# Patient Record
Sex: MALE | Race: BLACK OR AFRICAN AMERICAN | NOT HISPANIC OR LATINO | Employment: FULL TIME | URBAN - METROPOLITAN AREA
[De-identification: names, ages, dates, MRNs, and addresses within clinical notes are randomized per-mention and may not be internally consistent; named-entity substitution may affect disease eponyms.]

---

## 2019-10-08 ENCOUNTER — APPOINTMENT (OUTPATIENT)
Dept: PHYSICAL THERAPY | Facility: CLINIC | Age: 49
End: 2019-10-08
Payer: OTHER MISCELLANEOUS

## 2019-10-08 ENCOUNTER — EVALUATION (OUTPATIENT)
Dept: PHYSICAL THERAPY | Facility: CLINIC | Age: 49
End: 2019-10-08
Payer: OTHER MISCELLANEOUS

## 2019-10-08 DIAGNOSIS — G89.29 CHRONIC RIGHT SHOULDER PAIN: Primary | ICD-10-CM

## 2019-10-08 DIAGNOSIS — M25.511 CHRONIC RIGHT SHOULDER PAIN: Primary | ICD-10-CM

## 2019-10-08 PROCEDURE — 97110 THERAPEUTIC EXERCISES: CPT | Performed by: PHYSICAL THERAPIST

## 2019-10-08 PROCEDURE — 97161 PT EVAL LOW COMPLEX 20 MIN: CPT | Performed by: PHYSICAL THERAPIST

## 2019-10-08 NOTE — LETTER
2019    Mikhail Clark MD   [facility] [id number]    Patient: Dipti Levi   YOB: 1970   Date of Visit: 10/8/2019     Encounter Diagnosis     ICD-10-CM    1  Chronic right shoulder pain M25 511     G89 29        Dear Dr dAdie Jimenez:    Thank you for your recent referral of Dipti Levi  Please review the attached evaluation summary from Khoa's recent visit  Please verify that you agree with the plan of care by signing the attached order  If you have any questions or concerns, please do not hesitate to call  I sincerely appreciate the opportunity to share in the care of one of your patients and hope to have another opportunity to work with you in the near future  Sincerely,    Sonu Suh, PT      Referring Provider:      I certify that I have read the below Plan of Care and certify the need for these services furnished under this plan of treatment while under my care  Mikhail Clark MD  [address] Facsimile: [phone]          PT Evaluation     Today's date: 10/8/2019  Patient name: Dipti Levi  : 1970  MRN: [id number]  Referring provider: Geneva Lama MD  Dx:   Encounter Diagnosis     ICD-10-CM    1  Chronic right shoulder pain M25 511     G89 29        Start Time: 1800  Stop Time: 1845  Total time in clinic (min): 45 minutes    Assessment  Assessment details: Dipti Levi is a 47 y o  male who presents to physical therapy with diagnosis of right shoulder pain  Garrisonvictor m Rodney presents with pain, poor posture, and limitations in range of motion, strength, joint mobility, and functional ability  He tested positive for involvement of RTC, will confirm when patient brings MRI documentation next visit  The current limitations are affecting Khoa's ability to function at prior level   He will benefit from skilled physical therapy to address the current impairments and functional limitations to enable him to return to daily activities as well as his ability to complete work tasks at premorbid status  Thank you for the referral     Patient demonstrates good understanding and tolerance to provided home exercise program    Impairments: abnormal muscle firing, abnormal muscle tone, abnormal or restricted ROM, abnormal movement, activity intolerance, impaired physical strength, lacks appropriate home exercise program, pain with function and poor posture     Symptom irritability: lowUnderstanding of Dx/Px/POC: good   Prognosis: good    Goals  STG  Patient will decrease pain at worst to 4-5/10  Patient will improve right shoulder flexion & abduction AROM 5 degrees  Patient will be independent with home exercise program    LTG  Patient will decrease pain at worst to 1-2/10  Patient will demonstrate full pain free R shoulder AROM  Patient will improve R shoulder strength 1/2 grade in all planes   Patient will report ability to complete ADLs without pain or limitations  Patient will improve FOTO score to 63    Plan  Patient would benefit from: skilled physical therapy  Planned modality interventions: cryotherapy, thermotherapy: hydrocollator packs, high voltage pulsed current: pain management, high voltage pulsed current: spasm management and unattended electrical stimulation  Planned therapy interventions: home exercise program, therapeutic exercise, therapeutic activities, therapeutic training, patient education, manual therapy and neuromuscular re-education  Frequency: 2x week  Duration in visits: 12  Duration in weeks: 6  Treatment plan discussed with: patient        Subjective Evaluation    History of Present Illness  Mechanism of injury: Reagan Castillo is a 47 y o  male presenting to physical therapy on 10/08/19 with primary complaints of right shoulder pain  He mentions the pain started 12/26/2018   He states 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 and he felt the pain in the shoulder and tried to lift them up and needed to get assistance secondary to a sharp pain in the right shoulder  He reports he was getting therapy in [place] for about 6 months and then when he moved here it took about 3 months for him to get an appointment here  He reports he got an injection about 2 weeks ago in the R which has slightly relieved the symptoms, he does have pain on the L now from using it more/compensating  He reports he is able to get ADLs done but it is painful,  He reports overhead, behind back, and across body motions hurt the body  He has been doing tasks at work with left hand to limit use of R  He reports he gets he occasionally gets some numbness in his his upper arm but not very often  He reports getting an MRI which showed a tear, he believes it was a RTC muscle but unsure, he will bring paperwork next visit  Pain  Current pain ratin  At best pain rating: 3  At worst pain ratin (prior to shot)  Location: anterior/lateral shoulder  Quality: sharp and throbbing  Relieving factors: medications  Aggravating factors: overhead activity    Hand dominance: right      Diagnostic Tests  MRI studies: abnormal  Treatments  Previous treatment: physical therapy and injection treatment  Patient Goals  Patient goals for therapy: decreased pain, increased motion and independence with ADLs/IADLs          Objective     Postural Observations  Seated posture: poor  Standing posture: poor        Cervical/Thoracic Screen   Cervical range of motion within normal limits    Active Range of Motion   Left Shoulder   Flexion: 150 degrees   Abduction: 160 degrees   External rotation BTH: T4   Internal rotation BTB: T6     Right Shoulder   Flexion: 132 degrees with pain  Abduction: 145 degrees with pain  External rotation BTH: Active external rotation behind the head: base of occiput   with pain  Internal rotation BTB: T11 with pain    Scapular Mobility     Right Shoulder   Scapular mobility: fair    Strength/Myotome Testing     Left Shoulder     Planes of Motion   Flexion: 4+   Abduction: 4+   External rotation at 0°:  5   Internal rotation at 0°:  5     Right Shoulder     Planes of Motion   Flexion: 4- (P!)   Abduction: 3+ (P!)   External rotation at 90°:  3+   Internal rotation at 0°:  4     Tests     Right Shoulder   Positive belly press, empty can, Hawkin's, horn blower and lift-off  Flowsheet Rows      Most Recent Value   PT/OT G-Codes   Current Score  47   Projected Score  63             Precautions: HTN      Manual  10/8            STJ lateral glide             R shoulder PROM             STM R UT                                           Exercise Diary  10/8            Pulleys Flex&abd nv            UBE fwd/bkwrd nv            scap retractions 5"x10            UT stretch 30"            IR strap Stretch 10"x10            Table slides scaption 10"x10            Table slides ER nv            Shoulder Isometrics nv            TB rows nv            TB extension nv            TB ER  Start with walk out              TB IR Start with walk out            Ball on wall cw/ccw                                                                                                            Modalities  10/8            CP PRN

## 2019-10-08 NOTE — PROGRESS NOTES
PT Evaluation     Today's date: 10/8/2019  Patient name: Jeannette Guo  : 1970  MRN: [id number]  Referring provider: Lyric Yi MD  Dx:   Encounter Diagnosis     ICD-10-CM    1  Chronic right shoulder pain M25 511     G89 29        Start Time: 1800  Stop Time: 1845  Total time in clinic (min): 45 minutes    Assessment  Assessment details: Jeannette Guo is a 47 y o  male who presents to physical therapy with diagnosis of right shoulder pain  Nicholas Turcios presents with pain, poor posture, and limitations in range of motion, strength, joint mobility, and functional ability  He tested positive for involvement of RTC, will confirm when patient brings MRI documentation next visit  The current limitations are affecting Khoa's ability to function at prior level  He will benefit from skilled physical therapy to address the current impairments and functional limitations to enable him to return to daily activities as well as his ability to complete work tasks at premorbid status   Thank you for the referral     Patient demonstrates good understanding and tolerance to provided home exercise program    Impairments: abnormal muscle firing, abnormal muscle tone, abnormal or restricted ROM, abnormal movement, activity intolerance, impaired physical strength, lacks appropriate home exercise program, pain with function and poor posture     Symptom irritability: lowUnderstanding of Dx/Px/POC: good   Prognosis: good    Goals  STG  Patient will decrease pain at worst to 4-5/10  Patient will improve right shoulder flexion & abduction AROM 5 degrees  Patient will be independent with home exercise program    LTG  Patient will decrease pain at worst to 1-2/10  Patient will demonstrate full pain free R shoulder AROM  Patient will improve R shoulder strength 1/2 grade in all planes   Patient will report ability to complete ADLs without pain or limitations  Patient will improve FOTO score to 63    Plan  Patient would benefit from: skilled physical therapy  Planned modality interventions: cryotherapy, thermotherapy: hydrocollator packs, high voltage pulsed current: pain management, high voltage pulsed current: spasm management and unattended electrical stimulation  Planned therapy interventions: home exercise program, therapeutic exercise, therapeutic activities, therapeutic training, patient education, manual therapy and neuromuscular re-education  Frequency: 2x week  Duration in visits: 12  Duration in weeks: 6  Treatment plan discussed with: patient        Subjective Evaluation    History of Present Illness  Mechanism of injury: Nancy Parr is a 47 y o  male presenting to physical therapy on 10/08/19 with primary complaints of right shoulder pain  He mentions the pain started 2018  He states 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 and he felt the pain in the shoulder and tried to lift them up and needed to get assistance secondary to a sharp pain in the right shoulder  He reports he was getting therapy in [place] for about 6 months and then when he moved here it took about 3 months for him to get an appointment here  He reports he got an injection about 2 weeks ago in the R which has slightly relieved the symptoms, he does have pain on the L now from using it more/compensating  He reports he is able to get ADLs done but it is painful,  He reports overhead, behind back, and across body motions hurt the body  He has been doing tasks at work with left hand to limit use of R  He reports he gets he occasionally gets some numbness in his his upper arm but not very often  He reports getting an MRI which showed a tear, he believes it was a RTC muscle but unsure, he will bring paperwork next visit    Pain  Current pain ratin  At best pain rating: 3  At worst pain ratin (prior to shot)  Location: anterior/lateral shoulder  Quality: sharp and throbbing  Relieving factors: medications  Aggravating factors: overhead activity    Hand dominance: right      Diagnostic Tests  MRI studies: abnormal  Treatments  Previous treatment: physical therapy and injection treatment  Patient Goals  Patient goals for therapy: decreased pain, increased motion and independence with ADLs/IADLs          Objective     Postural Observations  Seated posture: poor  Standing posture: poor        Cervical/Thoracic Screen   Cervical range of motion within normal limits    Active Range of Motion   Left Shoulder   Flexion: 150 degrees   Abduction: 160 degrees   External rotation BTH: T4   Internal rotation BTB: T6     Right Shoulder   Flexion: 132 degrees with pain  Abduction: 145 degrees with pain  External rotation BTH: Active external rotation behind the head: base of occiput  with pain  Internal rotation BTB: T11 with pain    Scapular Mobility     Right Shoulder   Scapular mobility: fair    Strength/Myotome Testing     Left Shoulder     Planes of Motion   Flexion: 4+   Abduction: 4+   External rotation at 0°: 5   Internal rotation at 0°: 5     Right Shoulder     Planes of Motion   Flexion: 4- (P!)   Abduction: 3+ (P!)   External rotation at 90°: 3+   Internal rotation at 0°: 4     Tests     Right Shoulder   Positive belly press, empty can, Hawkin's, horn blower and lift-off  Flowsheet Rows      Most Recent Value   PT/OT G-Codes   Current Score  47   Projected Score  63             Precautions: HTN      Manual  10/8            STJ lateral glide             R shoulder PROM             STM R UT                                           Exercise Diary  10/8            Pulleys Flex&abd nv            UBE fwd/bkwrd nv            scap retractions 5"x10            UT stretch 30"            IR strap Stretch 10"x10            Table slides scaption 10"x10            Table slides ER nv            Shoulder Isometrics nv            TB rows nv            TB extension nv            TB ER  Start with walk out              TB IR Start with walk out Ball on wall cw/ccw                                                                                                            Modalities  10/8            CP PRN

## 2019-10-09 ENCOUNTER — TRANSCRIBE ORDERS (OUTPATIENT)
Dept: PHYSICAL THERAPY | Facility: CLINIC | Age: 49
End: 2019-10-09

## 2019-10-09 DIAGNOSIS — G89.29 CHRONIC RIGHT SHOULDER PAIN: Primary | ICD-10-CM

## 2019-10-09 DIAGNOSIS — M25.511 CHRONIC RIGHT SHOULDER PAIN: Primary | ICD-10-CM

## 2019-10-10 ENCOUNTER — OFFICE VISIT (OUTPATIENT)
Dept: PHYSICAL THERAPY | Facility: CLINIC | Age: 49
End: 2019-10-10
Payer: OTHER MISCELLANEOUS

## 2019-10-10 DIAGNOSIS — G89.29 CHRONIC RIGHT SHOULDER PAIN: Primary | ICD-10-CM

## 2019-10-10 DIAGNOSIS — M25.511 CHRONIC RIGHT SHOULDER PAIN: Primary | ICD-10-CM

## 2019-10-10 PROCEDURE — 97010 HOT OR COLD PACKS THERAPY: CPT

## 2019-10-10 PROCEDURE — 97110 THERAPEUTIC EXERCISES: CPT

## 2019-10-10 PROCEDURE — 97140 MANUAL THERAPY 1/> REGIONS: CPT

## 2019-10-10 NOTE — PROGRESS NOTES
Daily Note     Today's date: 10/10/2019  Patient name: Colonel Gutierrez  : 1970  MRN: [id number]  Referring provider: Nathanael Rojas MD  Dx:   Encounter Diagnosis     ICD-10-CM    1  Chronic right shoulder pain M25 511     G89 29                   Subjective: Pt reports 4/10 pain in R shoulder  He says he felt good after therapy session  Objective: See treatment diary below      Assessment: Tolerated treatment session well today with no increases in pain or discomfort  Pt had most difficulty with ball on wall exercise as he has decreased shoulder endurance  Plan: Continue with current POC to address pt deficits  Precautions: HTN      Manual  10/8 10/10           STJ lateral glide             R shoulder PROM             STM R UT                                           Exercise Diary  10/8 10/10           Pulleys Flex&abd nv 20/20           UBE fwd/bkwrd nv 2'/2'           scap retractions 5"x10 5"x10            UT stretch 30" 30"x3           IR strap Stretch 10"x10            Table slides scaption 10"x10            Table slides ER nv            Shoulder Isometrics nv 10" 5x ea           TB rows nv            TB extension nv            TB ER  Start with walk out              TB IR Start with walk out            Ball on wall cw/ccw  20 ea                                                                                                          Modalities  10/8 10/10           CP PRN

## 2019-10-10 NOTE — PROGRESS NOTES
Daily Note     Today's date: 10/10/2019  Patient name: Leah Chaidez  : 1970  MRN: [id number]  Referring provider: Marixa Marquis MD  Dx:   Encounter Diagnosis     ICD-10-CM    1  Chronic right shoulder pain M25 511     G89 29                   Subjective: Pt reports 4/10 pain in R shoulder  He states he felt good after last therapy session and has been compliant with HEP  Objective: See treatment diary below      Assessment: Tolerated additional exercises well today having most difficulty with ball on wall exercise as pt has decreased endurance  Right upper trapezius tightness presence during STM  Pt is limited in both flexion and abduction  Verbal cueing for proper posture during exercises  Ended therapy with CPx10 minutes to decrease pain  Plan: Continue with current POC to address pt deficits  Precautions: HTN      Manual  10/8 10/10           STJ lateral glide             R shoulder PROM  10'           STM R UT  10'                                         Exercise Diary  10/8 10/10           Pulleys Flex&abd nv 20/20           UBE fwd/bkwrd nv            scap retractions 5"x10 5"x10           UT stretch 30" 30"x3           IR strap Stretch 10"x10            Table slides scaption 10"x10            Table slides ER nv            Shoulder Isometrics nv 10"x5 ea           TB rows nv            TB extension nv            TB ER  Start with walk out              TB IR Start with walk out            Ball on wall cw/ccw  20/20                                                                                                          Modalities  10/8 10/10           CP PRN  10'

## 2019-10-15 ENCOUNTER — OFFICE VISIT (OUTPATIENT)
Dept: PHYSICAL THERAPY | Facility: CLINIC | Age: 49
End: 2019-10-15
Payer: OTHER MISCELLANEOUS

## 2019-10-15 DIAGNOSIS — M25.511 CHRONIC RIGHT SHOULDER PAIN: Primary | ICD-10-CM

## 2019-10-15 DIAGNOSIS — G89.29 CHRONIC RIGHT SHOULDER PAIN: Primary | ICD-10-CM

## 2019-10-15 PROCEDURE — 97110 THERAPEUTIC EXERCISES: CPT

## 2019-10-15 PROCEDURE — 97140 MANUAL THERAPY 1/> REGIONS: CPT

## 2019-10-15 NOTE — PROGRESS NOTES
Daily Note     Today's date: 10/15/2019  Patient name: Tera Cyr  : 1970  MRN: [id number]  Referring provider: Jennifer Greene MD  Dx:   Encounter Diagnosis     ICD-10-CM    1  Chronic right shoulder pain M25 511     G89 29                   Subjective: Pt reports 3/10 pain in R shoulder and states that he felt good after last therapy session  Objective: See treatment diary below      Assessment: Tolerated treatment well today tolerating new exericses without an increase in pain or discomfort  Pt has poor endurance in R shoulder at this time  Relief is felt from STM to R UT  Decreased ROM in flexion and internal rotation noted during PROM  Denied CP post therapy  Plan: Continue with current POC to address pt deficits  Precautions: HTN      Manual  10/8 10/10 10/15          STJ lateral glide             R shoulder PROM  10' 10'          STM R UT  10' 10'                                        Exercise Diary  10/8 10/10 10/15          Pulleys Flex&abd nv  20/20          UBE fwd/bkwrd nv  2'/2'          scap retractions 5"x10 5"x10 5"x15          UT stretch 30" 30"x3 30"x3          IR strap Stretch 10"x10            Table slides scaption 10"x10            Table slides ER nv            Shoulder Isometrics nv 10"x5 ea 10"x5          TB rows nv            TB extension nv            TB ER  Start with walk out    YTB Walk out ISO 10x          TB IR Start with walk out            Ball on wall cw/ccw  20/20 20/20                                                                                                         Modalities  10/8 10/10           CP PRN  10'

## 2019-10-17 ENCOUNTER — OFFICE VISIT (OUTPATIENT)
Dept: PHYSICAL THERAPY | Facility: CLINIC | Age: 49
End: 2019-10-17
Payer: OTHER MISCELLANEOUS

## 2019-10-17 DIAGNOSIS — M25.511 CHRONIC RIGHT SHOULDER PAIN: Primary | ICD-10-CM

## 2019-10-17 DIAGNOSIS — G89.29 CHRONIC RIGHT SHOULDER PAIN: Primary | ICD-10-CM

## 2019-10-17 PROCEDURE — 97110 THERAPEUTIC EXERCISES: CPT

## 2019-10-17 PROCEDURE — 97140 MANUAL THERAPY 1/> REGIONS: CPT

## 2019-10-17 PROCEDURE — 97010 HOT OR COLD PACKS THERAPY: CPT

## 2019-10-17 NOTE — PROGRESS NOTES
Daily Note     Today's date: 10/17/2019  Patient name: Lindsay Snyder  : 1970  MRN: [id number]  Referring provider: Jose Harrington MD  Dx:   Encounter Diagnosis     ICD-10-CM    1  Chronic right shoulder pain M25 511     G89 29                   Subjective: Pt reports 4/10 pain in R shoulder today  Objective: See treatment diary below      Assessment: Due to increase in pain pt was not able to tolerate new exercises  May be able to try circuit UE next session  Pt has most difficulty reaching OH and with horizontal adduction and has poor endurance  Trigger points present in cervical paraspinals as well as R upper trap  Ended therapy with CPx10 min to decrease pain  Plan: Continue with current POC to address pt deficits  Precautions: HTN      Manual  10/8 10/10 10/15 10/17         STJ lateral glide             R shoulder PROM  10' 10' 10'         STM R UT  10' 10' 10'                                       Exercise Diary  10/8 10/10 10/15 10/17         Pulleys Flex&abd nv 20/20 20/20 30/30         UBE fwd/bkwrd nv  2'/2' 2'/2'         scap retractions 5"x10 5"x10 5"x15          UT stretch 30" 30"x3 30"x3          IR strap Stretch 10"x10            Table slides scaption 10"x10            Table slides ER nv            Shoulder Isometrics nv 10"x5 ea 10"x5          TB rows nv            TB extension nv            TB ER  Start with walk out    YTB Walk out ISO 10x YTB walk out ISO 5x         TB IR Start with walk out   YTB walk out ISO 5x         Ball on wall cw/ccw  20/20 20/20 20/20           Grippers    20x bl                                                                                           Modalities  10/8 10/10 10/17          CP PRN  10' 10'

## 2019-10-22 ENCOUNTER — OFFICE VISIT (OUTPATIENT)
Dept: PHYSICAL THERAPY | Facility: CLINIC | Age: 49
End: 2019-10-22
Payer: OTHER MISCELLANEOUS

## 2019-10-22 DIAGNOSIS — M25.511 CHRONIC RIGHT SHOULDER PAIN: Primary | ICD-10-CM

## 2019-10-22 DIAGNOSIS — G89.29 CHRONIC RIGHT SHOULDER PAIN: Primary | ICD-10-CM

## 2019-10-22 PROCEDURE — 97140 MANUAL THERAPY 1/> REGIONS: CPT

## 2019-10-22 PROCEDURE — 97110 THERAPEUTIC EXERCISES: CPT

## 2019-10-22 PROCEDURE — 97035 APP MDLTY 1+ULTRASOUND EA 15: CPT

## 2019-10-22 NOTE — PROGRESS NOTES
Daily Note     Today's date: 10/22/2019  Patient name: Jill Araya  : 1970  MRN: [id number]  Referring provider: Geeta Celestin MD  Dx:   Encounter Diagnosis     ICD-10-CM    1  Chronic right shoulder pain M25 511     G89 29        Start Time: 1750          Subjective: Patient stated pain in shoulder is 3-4/10 today  Objective: See treatment diary below      Assessment: Patient brought in MRI results which says he has a glenoid labral tear  Did not perform TYB ext/int iso walkouts today due to increased irritation  Conferred with primary PT to add pulsed US at 3 3 Mhz and 50% to anterior aspect of shoulder to help increase healing with no contraindications  Pt has most difficulty with external rotation as it irritates the anterior shoulder and is most limited in external rotation during PROM  Ended with CPx10 min to decrease pain  Plan: Continue per plan of care  Precautions: HTN      Manual  10/8 10/10 10/15 10/17 10/22        STJ lateral glide             R shoulder PROM  10' 10' 10' 10'        STM R UT  10' 10 10'                                       Exercise Diary  10/8 10/10 10/15 10/17 10/22        Pulleys Flex&abd nv 20/20 20/20 30/30 30/30        UBE fwd/bkwrd nv  2'/2' 2'/2' 3'/2        scap retractions 5"x10 5"x10 5"x15          UT stretch 30" 30"x3 30"x3          IR strap Stretch 10"x10            Table slides scaption 10"x10            Table slides ER nv            Shoulder Isometrics nv 10"x5 ea 10"x5  10"x5        TB rows nv            TB extension nv            TB ER  Start with walk out    YTB Walk out ISO 10x YTB walk out ISO 5x         TB IR Start with walk out   YTB walk out ISO 5x         Ball on wall cw/ccw  20/20 20/20 20/20   20/20        Grippers    20x bl                                                                                           Modalities  10/8 10/10 10/17 10/22         CP PRN  10' 10'          US 3 3 mhz 50% ant shoulder    8'

## 2019-10-24 ENCOUNTER — APPOINTMENT (OUTPATIENT)
Dept: PHYSICAL THERAPY | Facility: CLINIC | Age: 49
End: 2019-10-24
Payer: OTHER MISCELLANEOUS

## 2019-10-29 ENCOUNTER — OFFICE VISIT (OUTPATIENT)
Dept: PHYSICAL THERAPY | Facility: CLINIC | Age: 49
End: 2019-10-29
Payer: OTHER MISCELLANEOUS

## 2019-10-29 DIAGNOSIS — G89.29 CHRONIC RIGHT SHOULDER PAIN: Primary | ICD-10-CM

## 2019-10-29 DIAGNOSIS — M25.511 CHRONIC RIGHT SHOULDER PAIN: Primary | ICD-10-CM

## 2019-10-29 PROCEDURE — 97112 NEUROMUSCULAR REEDUCATION: CPT

## 2019-10-29 PROCEDURE — 97035 APP MDLTY 1+ULTRASOUND EA 15: CPT

## 2019-10-29 PROCEDURE — 97110 THERAPEUTIC EXERCISES: CPT

## 2019-10-29 NOTE — PROGRESS NOTES
Daily Note     Today's date: 10/29/2019  Patient name: Ray Warner  : 1970  MRN: [id number]  Referring provider: Octaviano Fox MD  Dx:   Encounter Diagnosis     ICD-10-CM    1  Chronic right shoulder pain M25 511     G89 29                   Subjective: Pt reports 5/10 pain in R ant  shoulder  He stated it was worse at work today but as since subsided most likely due to rest        Objective: See treatment diary below      Assessment: Pt tolerated additional exercise well today at a light resistance with no increase in pain  Visual and tactile cueing to ensure correct exercise technique and correct scapular position  Ended with CPx10 min to decrease pain  Plan: Continue with current POC to address pt deficits  Precautions: HTN      Manual  10/8 10/10 10/15 10/17 10/22        STJ lateral glide             R shoulder PROM  10' 10' 10' 10'        STM R UT  10' 10 10'                                       Exercise Diary  10/8 10/10 10/15 10/17 10/22 10/29       Pulleys Flex&abd nv 20/20 20/20 30/30 30/30 30/30       UBE fwd/bkwrd nv  2'/2' 2'/2' 3'/2 3/3       scap retractions 5"x10 5"x10 5"x15          UT stretch 30" 30"x3 30"x3          IR strap Stretch 10"x10            Table slides scaption 10"x10            Table slides ER nv            Shoulder Isometrics nv 10"x5 ea 10"x5  10"x5 10"x5       TB rows nv     YTB 15x       TB extension nv     YTB 15x       TB ER  Start with walk out    YTB Walk out ISO 10x YTB walk out ISO 5x         TB IR Start with walk out   YTB walk out ISO 5x         Ball on wall cw/ccw  20/20 20/20 20/20   20/20        Grippers    20x bl  20x bl       Circuit UE bicep/tricep&chest press      15x ea lvl1                                                                            Modalities  10/8 10/10 10/17 10/22 10/29        CP PRN  10' 10'          US 3 3 mhz 50% ant shoulder    '

## 2019-10-31 ENCOUNTER — OFFICE VISIT (OUTPATIENT)
Dept: PHYSICAL THERAPY | Facility: CLINIC | Age: 49
End: 2019-10-31
Payer: OTHER MISCELLANEOUS

## 2019-10-31 DIAGNOSIS — M25.511 CHRONIC RIGHT SHOULDER PAIN: Primary | ICD-10-CM

## 2019-10-31 DIAGNOSIS — G89.29 CHRONIC RIGHT SHOULDER PAIN: Primary | ICD-10-CM

## 2019-10-31 PROCEDURE — 97110 THERAPEUTIC EXERCISES: CPT

## 2019-10-31 PROCEDURE — 97010 HOT OR COLD PACKS THERAPY: CPT

## 2019-10-31 PROCEDURE — 97112 NEUROMUSCULAR REEDUCATION: CPT

## 2019-10-31 PROCEDURE — 97035 APP MDLTY 1+ULTRASOUND EA 15: CPT

## 2019-10-31 NOTE — PROGRESS NOTES
Daily Note     Today's date: 10/31/2019  Patient name: Jonathan Mendoza  : 1970  MRN: [id number]  Referring provider: Tin Alexis MD  Dx:   Encounter Diagnosis     ICD-10-CM    1  Chronic right shoulder pain M25 511     G89 29                   Subjective: Pt reports that his shoulder was pretty sore yesterday as he was also performing a lot of lifting at work; he is feeling a lot better today  Objective: See treatment diary below      Assessment: Added ball posture to work on correct scapular positioning and posture  Pt needs visual and tactile cueing for correct exercise technique and scapular positioning throughout therapy session  Emphasis on posture during therapy today  Progressed to red thera band without any increases in pain  Ended with MH to increase circulation to R shoulder and relax musculature  Pt was educated to continue being aware of posture throughout the weekend  Plan: Continue with current POC to address pt deficits  Precautions: HTN      Manual  10/8 10/10 10/15 10/17 10/22        STJ lateral glide             R shoulder PROM  10' 10' 10' 10'        STM R UT  10' 10' 10'                                       Exercise Diary  10/8 10/10 10/15 10/17 10/22 10/29 10/31      Pulleys Flex&abd nv 20/20 20/20 30/30 30/30 30/30 30/30      UBE fwd/bkwrd nv  2'/2' 2'/2' 3'/2 3/3 3'/'3'      scap retractions 5"x10 5"x10 5"x15          UT stretch 30" 30"x3 30"x3          IR strap Stretch 10"x10            Table slides scaption 10"x10            Table slides ER nv            Shoulder Isometrics nv 10"x5 ea 10"x5  10"x5 10"x5       TB rows nv     YTB 15x RTB 15x      TB extension nv     YTB 15x RTB 15x      TB ER  Start with walk out    YTB Walk out ISO 10x YTB walk out ISO 5x         TB IR Start with walk out   YTB walk out ISO 5x         Ball on wall cw/ccw  20/20 20/20 20/20   20/20  20/20      Grippers    20x bl  20x bl 20x bl      Circuit UE bicep/tricep&chest press      15x ea lvl1 15x ea lvl 1      Ball Posture       10x ea                                                              Modalities  10/8 10/10 10/17 10/22 10/29 10/31       CP/MH PRN  10' 10'   10'       US 3 3 mhz 50% ant shoulder    8' 8' 8'

## 2019-11-05 ENCOUNTER — OFFICE VISIT (OUTPATIENT)
Dept: PHYSICAL THERAPY | Facility: CLINIC | Age: 49
End: 2019-11-05
Payer: OTHER MISCELLANEOUS

## 2019-11-05 DIAGNOSIS — G89.29 CHRONIC RIGHT SHOULDER PAIN: Primary | ICD-10-CM

## 2019-11-05 DIAGNOSIS — M25.511 CHRONIC RIGHT SHOULDER PAIN: Primary | ICD-10-CM

## 2019-11-05 PROCEDURE — 97014 ELECTRIC STIMULATION THERAPY: CPT

## 2019-11-05 NOTE — PROGRESS NOTES
Daily Note     Today's date: 2019  Patient name: Kobe Trujillo  : 1970  MRN: [id number]  Referring provider: Maria Mathur MD  Dx:   Encounter Diagnosis     ICD-10-CM    1  Chronic right shoulder pain M25 511     G89 29                   Subjective: Pt reports a lifting injury at work to his R shoulder  He is unable to fully close his hand as he feels it is swollen and a feeling of heaviness  Pt will see his doctor on Saturday to discuss this  Objective: See treatment diary below      Assessment: No exercise performed today  Modalities to decrease edema and decrease pain  Pt was verbalized contraindications to e-stim which he denied having any  Pt felt some relief post therapy but still had pain in R shoulder  Plan: Continue with current POC to address pt deficits  Precautions: HTN      Manual  10/8 10/10 10/15 10/17 10/22        STJ lateral glide             R shoulder PROM  10' 10' 10' 10'        STM R UT  10' 10' 10'                                       Exercise Diary  10/8 10/10 10/15 10/17 10/22 10/29 10/31 11/5     Pulleys Flex&abd nv 20/20 20/20 30/30 30/30 30/30 30/30      UBE fwd/bkwrd nv  2'/2' 2'/2' 3'/2 3/3 3'/'3'      scap retractions 5"x10 5"x10 5"x15          UT stretch 30" 30"x3 30"x3          IR strap Stretch 10"x10            Table slides scaption 10"x10            Table slides ER nv            Shoulder Isometrics nv 10"x5 ea 10"x5  10"x5 10"x5       TB rows nv     YTB 15x RTB 15x      TB extension nv     YTB 15x RTB 15x      TB ER  Start with walk out    YTB Walk out ISO 10x YTB walk out ISO 5x         TB IR Start with walk out   YTB walk out ISO 5x         Ball on wall cw/ccw  20/20 20/20 20/20   20/20  20/20      Grippers    20x bl  20x bl 20x bl      Circuit UE bicep/tricep&chest press      15x ea lvl1 15x ea lvl 1      Ball Posture       10x ea                                                              Modalities  10/8 10/10 10/17 10/22 10/29 10/31 11 CP/MH PRN  10' 10'   10' 20'      US 3 3 mhz 50% ant shoulder    8' 8' 8'       E-Stim       20'

## 2019-11-07 ENCOUNTER — OFFICE VISIT (OUTPATIENT)
Dept: PHYSICAL THERAPY | Facility: CLINIC | Age: 49
End: 2019-11-07
Payer: OTHER MISCELLANEOUS

## 2019-11-07 DIAGNOSIS — M25.511 CHRONIC RIGHT SHOULDER PAIN: Primary | ICD-10-CM

## 2019-11-07 DIAGNOSIS — G89.29 CHRONIC RIGHT SHOULDER PAIN: Primary | ICD-10-CM

## 2019-11-07 PROCEDURE — 97112 NEUROMUSCULAR REEDUCATION: CPT

## 2019-11-07 NOTE — PROGRESS NOTES
Daily Note     Today's date: 2019  Patient name: William Mesa  : 1970  MRN: [id number]  Referring provider: Gerardo Monge MD  Dx:   Encounter Diagnosis     ICD-10-CM    1  Chronic right shoulder pain M25 511     G89 29        Start Time:   Stop Time:   Total time in clinic (min): 30 minutes    Subjective: Patient stated shoulder is feeling better, 4/10  Objective: See treatment diary below      Assessment: Resumed full program as tolerated  Provided patient w/ shoulder tband HEP  Added planks and pushups to improve strength and stability of shoulder girdle  Cueing for posture  Plan: Continue per plan of care  Precautions: HTN      Manual  10/8 10/10 10/15 10/17 10/22        STJ lateral glide             R shoulder PROM  10' 10' 10' 10'        STM R UT  10 10' 10'                                       Exercise Diary  10/8 10/10 10/15 10/17 10/22 10/29 10/31 11/5 11    Pulleys Flex&abd nv 20/20 20/20 30/30 30/30 30/30 30/30  30x30    UBE fwd/bkwrd nv  2'/2' 2'/2' 3'/2 3/3 3'/'3'      scap retractions 5"x10 5"x10 5"x15          UT stretch 30" 30"x3 30"x3          IR strap Stretch 10"x10            Table slides scaption 10"x10            Table slides ER nv            Shoulder Isometrics nv 10"x5 ea 10"x5  10"x5 10"x5       TB rows nv     YTB 15x RTB 15x  BTB 4-way 20x    TB extension nv     YTB 15x RTB 15x      TB ER  Start with walk out    YTB Walk out ISO 10x YTB walk out ISO 5x         TB IR Start with walk out   YTB walk out ISO 5x         Ball on wall cw/ccw  20/20 20/20 20/20   20/20  20/20  20/20      Grippers    20x bl  20x bl 20x bl      Circuit UE bicep/tricep&chest press      15x ea lvl1 15x ea lvl 1      Ball Posture       10x ea      Planks on elbows/hands         10" 3x ea    Ball push-ups         20x    Body blade         15" 2x 3-way                     Modalities  10/8 10/10 10/17 10/22 10/29 10/31 11/5      CP/MH PRN  10' 10'   10' 20      US 3 3 mhz 50% ant shoulder    8' 8' 8'       E-Stim       20'

## 2019-11-12 ENCOUNTER — APPOINTMENT (OUTPATIENT)
Dept: PHYSICAL THERAPY | Facility: CLINIC | Age: 49
End: 2019-11-12
Payer: OTHER MISCELLANEOUS

## 2019-11-14 ENCOUNTER — APPOINTMENT (OUTPATIENT)
Dept: PHYSICAL THERAPY | Facility: CLINIC | Age: 49
End: 2019-11-14
Payer: OTHER MISCELLANEOUS

## 2019-11-19 ENCOUNTER — OFFICE VISIT (OUTPATIENT)
Dept: PHYSICAL THERAPY | Facility: CLINIC | Age: 49
End: 2019-11-19
Payer: OTHER MISCELLANEOUS

## 2019-11-19 DIAGNOSIS — M25.511 CHRONIC RIGHT SHOULDER PAIN: Primary | ICD-10-CM

## 2019-11-19 DIAGNOSIS — G89.29 CHRONIC RIGHT SHOULDER PAIN: Primary | ICD-10-CM

## 2019-11-19 PROCEDURE — 97110 THERAPEUTIC EXERCISES: CPT

## 2019-11-19 NOTE — PROGRESS NOTES
Daily Note     Today's date: 2019  Patient name: Andres Alfaro  : 1970  MRN: [id number]  Referring provider: Peiter Amor MD  Dx:   Encounter Diagnosis     ICD-10-CM    1  Chronic right shoulder pain M25 511     G89 29                   Subjective: Pt reports 4/10 pain in R shoulder  He received a Cortizone injection two weeks ago and reported a decrease in pain but has since shown mild relief  Objective: See treatment diary below      Assessment: Tolerated treatment well today, most discomfort with external rotation  Pt denied ball push ups as he felt they were too painful last time  Pt is having pain in that is in neck and was educated to inform his doctor as this has been going on for awhile now  Added 5# bicep curl 3-way which pt had to stop on last 10 for brachialis due to pain  Educated to continue icing at home  Plan: Continue with current POC to address pt deficits  Precautions: HTN      Manual  10/8 10/10 10/15 10/17 10/22        STJ lateral glide             R shoulder PROM  10' 10' 10' 10'        STM R UT  10' 10' 10'                                       Exercise Diary  10/8 10/10 10/15 10/17 10/22 10/29 10/31 11/5 11/7 11/19   Pulleys Flex&abd nv 20/20 20/20 30/30 30/30 30/30 30/30  30x30 30x30   UBE fwd/bkwrd nv  2'/2' 2'/2' 3'/2 3/3 3'/'3'   3/3   scap retractions 5"x10 5"x10 5"x15          UT stretch 30" 30"x3 30"x3          IR strap Stretch 10"x10            Table slides scaption 10"x10            Table slides ER nv            Shoulder Isometrics nv 10"x5 ea 10"x5  10"x5 10"x5       TB rows nv     YTB 15x RTB 15x  BTB 4-way 20x BTB 4-way 20x   TB extension nv     YTB 15x RTB 15x      TB ER  Start with walk out    YTB Walk out ISO 10x YTB walk out ISO 5x         TB IR Start with walk out   YTB walk out ISO 5x         Ball on wall cw/ccw  20/20 20/20 20/20   20/20  20/20  20/20   20/20   Grippers    20x bl  20x bl 20x bl   30x bl   Circuit UE bicep/tricep&chest press      15x ea lvl1 15x ea lvl 1   2x10   Ball Posture       10x ea      Planks on elbows/hands         10" 3x ea    Ball push-ups         20x    Body blade         15" 2x 3-way 15" 2x 3-way   Bicep Curl 3-way          10x ea 5#       Modalities  10/8 10/10 10/17 10/22 10/29 10/31 11/5      CP/MH PRN  10' 10'   10' 20'      US 3 3 mhz 50% ant shoulder    8' 8' 8'       E-Stim       20'

## 2019-11-21 ENCOUNTER — OFFICE VISIT (OUTPATIENT)
Dept: PHYSICAL THERAPY | Facility: CLINIC | Age: 49
End: 2019-11-21
Payer: OTHER MISCELLANEOUS

## 2019-11-21 DIAGNOSIS — G89.29 CHRONIC RIGHT SHOULDER PAIN: Primary | ICD-10-CM

## 2019-11-21 DIAGNOSIS — M25.511 CHRONIC RIGHT SHOULDER PAIN: Primary | ICD-10-CM

## 2019-11-21 PROCEDURE — 97112 NEUROMUSCULAR REEDUCATION: CPT

## 2019-11-21 PROCEDURE — 97010 HOT OR COLD PACKS THERAPY: CPT

## 2019-11-21 PROCEDURE — 97110 THERAPEUTIC EXERCISES: CPT

## 2019-11-21 NOTE — PROGRESS NOTES
Daily Note     Today's date: 2019  Patient name: Mariela Abdi  : 1970  MRN: [id number]  Referring provider: Cadence Smith MD  Dx:   Encounter Diagnosis     ICD-10-CM    1  Chronic right shoulder pain M25 511     G89 29        Start Time: 1730  Stop Time:   Total time in clinic (min): 45 minutes    Subjective: Patient stated his pain in shoulder is 3/10  Feeling better  Objective: See treatment diary below      Assessment: Cueing for posture  Good endurance  No increased pain noted  Continued performing postural exercises to improve posture  MH to shoulder post exercises to decrease tightness in shoulder  Plan: Continue per plan of care        Precautions: HTN      Manual  10/8 10/10 10/15 10/17 10/22        STJ lateral glide             R shoulder PROM  10' 10' 10' 10        STM R UT  10' 10' 10'                                       Exercise Diary              Pulleys Flex&abd 30x            UBE fwd/bkwrd 5'            scap retractions             UT stretch             IR strap Stretch             Table slides scaption             Table slides ER             Shoulder Isometrics             TB rows 20x            TB extension 20x            TB ER  20x            TB IR x20            Ball on wall cw/ccw 20x            Grippers 50x            Circuit UE bicep/tricep&chest press 20x            Ball Posture 20x            Planks on elbows/hands             Ball push-ups             Body blade             Bicep Curl 3-way 20xea                Modalities  10/8 10/10 10/17 10/22 10/29 10/31 11/5 11/21     CP/MH PRN  10' 10'   10' 20' 15'     US 3 3 mhz 50% ant shoulder    8' 8' 8'       E-Stim       20'

## 2019-11-26 ENCOUNTER — OFFICE VISIT (OUTPATIENT)
Dept: PHYSICAL THERAPY | Facility: CLINIC | Age: 49
End: 2019-11-26
Payer: OTHER MISCELLANEOUS

## 2019-11-26 DIAGNOSIS — M25.511 CHRONIC RIGHT SHOULDER PAIN: Primary | ICD-10-CM

## 2019-11-26 DIAGNOSIS — G89.29 CHRONIC RIGHT SHOULDER PAIN: Primary | ICD-10-CM

## 2019-11-26 PROCEDURE — 97112 NEUROMUSCULAR REEDUCATION: CPT | Performed by: PHYSICAL THERAPIST

## 2019-11-26 PROCEDURE — 97110 THERAPEUTIC EXERCISES: CPT | Performed by: PHYSICAL THERAPIST

## 2019-11-26 NOTE — PROGRESS NOTES
Daily Note     Today's date: 2019  Patient name: Lalo Greenberg  : 1970  MRN: [id number]  Referring provider: Kenisha Chavez MD  Dx:   Encounter Diagnosis     ICD-10-CM    1  Chronic right shoulder pain M25 511     G89 29               ASSESSMENT:  Patient attended 12 PT visits over 6 weeks for stretching and strengthening  He is working currently  He reports pain with overhead lifting work  Patient has made progress in improved MMT strength and normal ROM  Goals      LTG  Patient will decrease pain at worst to 1-2/10- MET  Patient will demonstrate full pain free R shoulder AROM - 80% MET  Patient will improve R shoulder strength 1/2 grade in all planes   -MET  Patient will report ability to complete ADLs without pain or limitations  -75% MET  Patient will improve FOTO score to 63 -SCORE 62    Subjective: Patient stated his pain in shoulder is 3/10  Feeling better  Objective: MMT 5/5, shoulder flexion 170      Plan: Continue per plan of care        Precautions: HTN      Manual  10/8 10/10 10/15 10/17 10/22        STJ lateral glide             R shoulder PROM  10' 10' 10' 10'        STM R UT  10' 10' 10'                                       Exercise Diary             Pulleys Flex&abd 30x            UBE fwd/bkwrd 5'            scap retractions             UT stretch             IR strap Stretch             Table slides scaption             Table slides ER             Shoulder Isometrics             TB rows 20x 20           TB extension 20x 20           TB ER  20x 20           TB IR x20 20           Ball on wall cw/ccw 20x 20           Grippers 50x 50           Circuit UE bicep/tricep&chest press 20x 20           Ball Posture 20x 20           Planks on elbows/hands             Ball push-ups             Body blade             Bicep Curl 3-way 20xea 20               Modalities  10/8 10/10 10/17 10/22 10/29 10/31 11/5 11/21     CP/MH PRN  10' 10'   10' 20' 15'     US 3 3 mhz 50% ant shoulder    8' 8' 8'       E-Stim       20'

## 2020-01-09 ENCOUNTER — APPOINTMENT (EMERGENCY)
Dept: RADIOLOGY | Facility: HOSPITAL | Age: 50
End: 2020-01-09
Payer: OTHER MISCELLANEOUS

## 2020-01-09 ENCOUNTER — HOSPITAL ENCOUNTER (EMERGENCY)
Facility: HOSPITAL | Age: 50
Discharge: HOME | End: 2020-01-09
Attending: EMERGENCY MEDICINE | Admitting: EMERGENCY MEDICINE
Payer: OTHER MISCELLANEOUS

## 2020-01-09 VITALS
DIASTOLIC BLOOD PRESSURE: 81 MMHG | TEMPERATURE: 98.2 F | BODY MASS INDEX: 32.28 KG/M2 | HEIGHT: 67 IN | SYSTOLIC BLOOD PRESSURE: 160 MMHG | HEART RATE: 60 BPM | RESPIRATION RATE: 20 BRPM | OXYGEN SATURATION: 97 % | WEIGHT: 205.69 LBS

## 2020-01-09 DIAGNOSIS — S29.012A STRAIN OF THORACIC BACK REGION: Primary | ICD-10-CM

## 2020-01-09 DIAGNOSIS — M70.72 BURSITIS OF LEFT HIP: ICD-10-CM

## 2020-01-09 DIAGNOSIS — M25.512 LEFT SHOULDER PAIN: ICD-10-CM

## 2020-01-09 PROCEDURE — 73502 X-RAY EXAM HIP UNI 2-3 VIEWS: CPT

## 2020-01-09 PROCEDURE — 99283 EMERGENCY DEPT VISIT LOW MDM: CPT | Performed by: PHYSICIAN ASSISTANT

## 2020-01-09 PROCEDURE — 73030 X-RAY EXAM OF SHOULDER: CPT

## 2020-01-09 PROCEDURE — 99283 EMERGENCY DEPT VISIT LOW MDM: CPT

## 2020-01-09 PROCEDURE — 72070 X-RAY EXAM THORAC SPINE 2VWS: CPT

## 2020-01-09 RX ORDER — NAPROXEN 250 MG/1
500 TABLET ORAL ONCE
Status: COMPLETED | OUTPATIENT
Start: 2020-01-09 | End: 2020-01-09

## 2020-01-09 RX ORDER — NAPROXEN 500 MG/1
500 TABLET ORAL 2 TIMES DAILY WITH MEALS
Qty: 30 TABLET | Refills: 0 | Status: SHIPPED | OUTPATIENT
Start: 2020-01-09

## 2020-01-09 RX ADMIN — NAPROXEN 500 MG: 250 TABLET ORAL at 22:22

## 2020-01-10 NOTE — ED NOTES
Patient transported to [address] [place] [address] Khalif Nevarez, [address] [place] [address]  01/09/20 5697

## 2020-01-11 NOTE — ED PROVIDER NOTES
History  Chief Complaint   Patient presents with    Back Pain     pt c/o back, L hip, and L arm pain  pt was at work lifting heavy objects when he felt a pull in his back     Patient is a 58-year-old male presents emergency department complaints of thoracic back pain, left hip pain, left shoulder pain  Patient states he is at work lifting heavy objects today  He states he was very physically strong was day his job  He states that he is having thoracic back pain, left hip pain, left shoulder pain  He denies any radiating pain, he denies any numbness or tingling  He denies any weakness  None       Past Medical History:   Diagnosis Date    Hypertension        History reviewed  No pertinent surgical history  History reviewed  No pertinent family history  I have reviewed and agree with the history as documented  Social History     Tobacco Use    Smoking status: Never Smoker    Smokeless tobacco: Never Used   Substance Use Topics    Alcohol use: Never     Frequency: Never    Drug use: Not on file        Review of Systems   Constitutional: Negative for fever  Respiratory: Negative for shortness of breath  Cardiovascular: Negative for chest pain  Musculoskeletal: Positive for arthralgias and back pain  Physical Exam  Physical Exam   Constitutional: He is oriented to person, place, and time  He appears well-developed and well-nourished  HENT:   Head: Normocephalic and atraumatic  Right Ear: External ear normal    Left Ear: External ear normal    Nose: Nose normal    Mouth/Throat: Oropharynx is clear and moist    Eyes: Pupils are equal, round, and reactive to light  Conjunctivae and EOM are normal    Neck: Normal range of motion  Cardiovascular: Normal rate, regular rhythm and normal heart sounds  Pulmonary/Chest: Effort normal and breath sounds normal    Abdominal: Soft  Musculoskeletal:        Left shoulder: He exhibits tenderness and pain   He exhibits normal range of motion and normal strength  Left hip: He exhibits bony tenderness  He exhibits normal range of motion and normal strength  Thoracic back: He exhibits tenderness and pain  He exhibits normal range of motion  There is generalized pain with range of motion left shoulder, there is no weakness noted  There is tenderness to palpation over the greater trochanter consistent with bursitis  Neurological: He is alert and oriented to person, place, and time  Skin: Skin is warm  Psychiatric: He has a normal mood and affect  His behavior is normal  Judgment and thought content normal    Vitals reviewed  Vital Signs  ED Triage Vitals   Temperature Pulse Respirations Blood Pressure SpO2   01/09/20 2046 01/09/20 2046 01/09/20 2046 01/09/20 2046 01/09/20 2046   98 2 °F (36 8 °C) 60 20 160/81 97 %      Temp Source Heart Rate Source Patient Position - Orthostatic VS BP Location FiO2 (%)   01/09/20 2046 01/09/20 2046 01/09/20 2046 01/09/20 2046 --   Oral Monitor Sitting Left arm       Pain Score       01/09/20 2222       7           Vitals:    01/09/20 2046   BP: 160/81   Pulse: 60   Patient Position - Orthostatic VS: Sitting         Visual Acuity      ED Medications  Medications   naproxen (NAPROSYN) tablet 500 mg (500 mg Oral Given 1/9/20 2222)       Diagnostic Studies  Results Reviewed     None                 XR spine thoracic 2 views   Final Result by Amanda Snyder MD (01/10 0801)      No acute osseous abnormality  Workstation performed: [id number]         XR hip/pelv 2-3 vws left if performed   Final Result by Amanda Snyder MD (01/10 0802)      No acute osseous abnormality  Workstation performed: [id number]         XR shoulder 2+ views LEFT   Final Result by Amanda Snyder MD (01/10 9224)      No acute osseous abnormality                 Workstation performed: [id number]                    Procedures  Procedures         ED Course                               MDM  Number of Diagnoses or Management Options  Bursitis of left hip:   Left shoulder pain:   Strain of thoracic back region:   Diagnosis management comments: Patient is a 79-year-old male presents emergency department complaints of left hip, left shoulder some thoracic back pain after a very physically strenuous day at work  X-ray images left shoulder reviewed not show any abnormality  X-ray images left hip reviewed not show any abnormality  X-ray images of the thoracic spine reviewed and does show a curve, there is no acute abnormality noted  Findings were discussed patient  I recommend activity modification, anti-inflammatories  Patient is follow up with occupational health  Return parameters were discussed  Patient stable for discharge  Amount and/or Complexity of Data Reviewed  Tests in the radiology section of CPT®: ordered and reviewed  Independent visualization of images, tracings, or specimens: yes    Risk of Complications, Morbidity, and/or Mortality  Presenting problems: moderate  Diagnostic procedures: moderate  Management options: moderate          Disposition  Final diagnoses:   Strain of thoracic back region   Bursitis of left hip   Left shoulder pain     Time reflects when diagnosis was documented in both MDM as applicable and the Disposition within this note     Time User Action Codes Description Comment    1/9/2020 10:04 PM Gearjudah Renetta Add [S29 012A] Strain of thoracic back region     1/9/2020 10:04 PM Gearold Renetta Add [M70 72] Bursitis of left hip     1/9/2020 10:04 PM Gearold Renetta Add [L05 555] Left shoulder pain       ED Disposition     ED Disposition Condition Date/Time Comment    Discharge Good u Jan 9, 2020 10:03  [address] discharge to home/self care              Follow-up Information     Follow up With Specialties Details Why Contact Info [address]  Schedule an appointment as soon as possible for a visit        [facility] Emergency Department Emergency Medicine  If symptoms worsen [address] [facility] [address] [id number]  [address] [place] [address] ED, [address] [facility], [facility], [address] [place] [address], [id number]          Discharge Medication List as of 1/9/2020 10:05 PM      START taking these medications    Details   naproxen (NAPROSYN) 500 mg tablet Take 1 tablet (500 mg total) by mouth 2 (two) times a day with meals, Starting Thu 1/9/2020, Print           No discharge procedures on file      ED Provider  Electronically Signed by           Monika Chappell PA-C  01/11/20 4226

## 2020-02-17 ENCOUNTER — EVALUATION (OUTPATIENT)
Dept: PHYSICAL THERAPY | Facility: CLINIC | Age: 50
End: 2020-02-17
Payer: OTHER MISCELLANEOUS

## 2020-02-17 DIAGNOSIS — M25.552 LEFT HIP PAIN: ICD-10-CM

## 2020-02-17 DIAGNOSIS — M54.50 ACUTE BILATERAL LOW BACK PAIN WITHOUT SCIATICA: Primary | ICD-10-CM

## 2020-02-17 DIAGNOSIS — M25.512 ACUTE PAIN OF LEFT SHOULDER: ICD-10-CM

## 2020-02-17 PROCEDURE — 97110 THERAPEUTIC EXERCISES: CPT | Performed by: PHYSICAL THERAPIST

## 2020-02-17 PROCEDURE — 97162 PT EVAL MOD COMPLEX 30 MIN: CPT | Performed by: PHYSICAL THERAPIST

## 2020-02-17 NOTE — PROGRESS NOTES
PT Evaluation     Today's date: 2020  Patient name: Xiang Li  : 1970  MRN: [id number]  Referring provider: Amaury Guo MD  Dx:   Encounter Diagnosis     ICD-10-CM    1  Acute bilateral low back pain without sciatica M54 5    2  Acute pain of left shoulder M25 512    3  Left hip pain M25 552                   Assessment  Assessment details: Patient presents with severe low back tightness radiating into legs  Bilateral shoulder stiffness and decreased strength and ROM  Paraspinal tightness to middle trapezius  Difficulty with all movements, transfers  Patient reports no pain relief with meds, heat or from injection  Patient reports getting MRI soon  +Slump< +SLR Upslip and rotation in pelvis, edema in low back +neural tension of LE's  Patient can benefit from skilled PT to decrease pain and improve ROM for return to normal activities  Impairments: activity intolerance and pain with function    Symptom irritability: highUnderstanding of Dx/Px/POC: good   Prognosis: good    Goals  3 weeks  Sit to stand without 10/10 pain  ROM shoulder flexion to functional limits  6 weeks  Decrease pain to minimal for return to work  Normal lumbar ROM  Normal shoulder strength  Able to walk, sit and stand for 30 minutes  Able to return to work        Plan  Planned modality interventions: ultrasound, cryotherapy and unattended electrical stimulation  Planned therapy interventions: manual therapy, joint mobilization, abdominal trunk stabilization, patient education, postural training, home exercise program, therapeutic activities and therapeutic exercise  Frequency: 3x week  Duration in weeks: 6        Subjective Evaluation    History of Present Illness  Date of onset: 2020  Mechanism of injury: Patient reports new WC injury to back, left hip and left shoulder due to lifting and twisting garbage    Pain  Current pain rating: 10  At best pain rating: 10  At worst pain rating: 10  Quality: burning, pressure, sharp and throbbing  Aggravating factors: sitting, standing and walking  Progression: no change    Patient Goals  Patient goals for therapy: return to work, decreased pain and increased motion          Objective     Active Range of Motion   Cervical/Thoracic Spine       Cervical    Flexion:  WFL  Extension:  Restriction level: minimal  Left rotation:  Restriction level: minimal  Right rotation:  Restriction level: minimal  Left Shoulder   Flexion: 90 degrees   Abduction: 90 degrees   External rotation BTH: C2   Internal rotation BTB: lumbar     Right Shoulder   Flexion: 90 degrees   Abduction: 90 degrees   External rotation BTH: C2   Internal rotation BTB: lumbar     Lumbar   Flexion:  Restriction level: moderate  Extension:  Restriction level: maximal  Left rotation:  Restriction level: moderate  Right rotation:  Restriction level: moderate    Strength/Myotome Testing     Left Shoulder     Planes of Motion   Flexion: 4   Abduction: 4+   External rotation at 0°: 4+     Right Shoulder     Planes of Motion   Flexion: 4   Abduction: 4+   External rotation at 0°: 4+     Tests     Lumbar     Left   Positive passive SLR and slump test      Right   Positive passive SLR  Left Pelvic Girdle/Sacrum   Positive: active SLR test      Right Pelvic Girdle/Sacrum   Positive: active SLR test      Left Hip   Positive Gillet's       General Comments:      Hip Comments   Hip flexion 4-/5 due to back pain             Precautions: high intensity of pain      Manual              MFR             traction                                                        Exercise Diary              bike                                                                                                                                                                                                                                                                        Modalities              Heat/stim             US

## 2020-02-17 NOTE — LETTER
2020    Wray Cushing, MD   [facility] [address]    Patient: Titi Flores   YOB: 1970   Date of Visit: 2020     Encounter Diagnosis     ICD-10-CM    1  Acute bilateral low back pain without sciatica M54 5    2  Acute pain of left shoulder M25 512    3  Left hip pain M25 552        Dear Dr Tracy Wilcox:    Thank you for your recent referral of Titi Flores  Please review the attached evaluation summary from [facility]'s recent visit  Please verify that you agree with the plan of care by signing the attached order  If you have any questions or concerns, please do not hesitate to call  I sincerely appreciate the opportunity to share in the care of one of your patients and hope to have another opportunity to work with you in the near future  Sincerely,    Elijah Ashby, PT      Referring Provider:      I certify that I have read the below Plan of Care and certify the need for these services furnished under this plan of treatment while under my care  Wray Cushing, MD   [facility] [address]  VIA Facsimile: [phone]          PT Evaluation     Today's date: 2020  Patient name: Titi Flores  : 1970  MRN: [id number]  Referring provider: Heide Cade MD  Dx:   Encounter Diagnosis     ICD-10-CM    1  Acute bilateral low back pain without sciatica M54 5    2  Acute pain of left shoulder M25 512    3  Left hip pain M25 552                   Assessment  Assessment details: Patient presents with severe low back tightness radiating into legs  Bilateral shoulder stiffness and decreased strength and ROM  Paraspinal tightness to middle trapezius  Difficulty with all movements, transfers  Patient reports no pain relief with meds, heat or from injection  Patient reports getting MRI soon  +Slump< +SLR Upslip and rotation in pelvis, edema in low back +neural tension of LE's    Patient can benefit from skilled PT to decrease pain and improve ROM for return to normal activities  Impairments: activity intolerance and pain with function    Symptom irritability: highUnderstanding of Dx/Px/POC: good   Prognosis: good    Goals  3 weeks  Sit to stand without 10/10 pain  ROM shoulder flexion to functional limits  6 weeks  Decrease pain to minimal for return to work  Normal lumbar ROM  Normal shoulder strength  Able to walk, sit and stand for 30 minutes  Able to return to work        Plan  Planned modality interventions: ultrasound, cryotherapy and unattended electrical stimulation  Planned therapy interventions: manual therapy, joint mobilization, abdominal trunk stabilization, patient education, postural training, home exercise program, therapeutic activities and therapeutic exercise  Frequency: 3x week  Duration in weeks: 6        Subjective Evaluation    History of Present Illness  Date of onset: 2/9/2020  Mechanism of injury: Patient reports new WC injury to back, left hip and left shoulder due to lifting and twisting garbage    Pain  Current pain rating: 10  At best pain rating: 10  At worst pain rating: 10  Quality: burning, pressure, sharp and throbbing  Aggravating factors: sitting, standing and walking  Progression: no change    Patient Goals  Patient goals for therapy: return to work, decreased pain and increased motion          Objective     Active Range of Motion   Cervical/Thoracic Spine       Cervical    Flexion:  WFL  Extension:  Restriction level: minimal  Left rotation:  Restriction level: minimal  Right rotation:  Restriction level: minimal  Left Shoulder   Flexion: 90 degrees   Abduction: 90 degrees   External rotation BTH: C2   Internal rotation BTB: lumbar     Right Shoulder   Flexion: 90 degrees   Abduction: 90 degrees   External rotation BTH: C2   Internal rotation BTB: lumbar     Lumbar   Flexion:  Restriction level: moderate  Extension:  Restriction level: maximal  Left rotation:  Restriction level: moderate  Right rotation:  Restriction level: moderate    Strength/Myotome Testing     Left Shoulder     Planes of Motion   Flexion: 4   Abduction: 4+   External rotation at 0°:  4+     Right Shoulder     Planes of Motion   Flexion: 4   Abduction: 4+   External rotation at 0°:  4+     Tests     Lumbar     Left   Positive passive SLR and slump test      Right   Positive passive SLR  Left Pelvic Girdle/Sacrum   Positive: active SLR test      Right Pelvic Girdle/Sacrum   Positive: active SLR test      Left Hip   Positive Gillet's       General Comments:      Hip Comments   Hip flexion 4-/5 due to back pain             Precautions: high intensity of pain      Manual              MFR             traction                                                        Exercise Diary              bike                                                                                                                                                                                                                                                                        Modalities              Heat/stim             US

## 2020-02-18 ENCOUNTER — TRANSCRIBE ORDERS (OUTPATIENT)
Dept: PHYSICAL THERAPY | Facility: CLINIC | Age: 50
End: 2020-02-18

## 2020-02-18 DIAGNOSIS — M54.50 ACUTE BILATERAL LOW BACK PAIN WITHOUT SCIATICA: Primary | ICD-10-CM

## 2020-02-20 ENCOUNTER — OFFICE VISIT (OUTPATIENT)
Dept: PHYSICAL THERAPY | Facility: CLINIC | Age: 50
End: 2020-02-20
Payer: OTHER MISCELLANEOUS

## 2020-02-20 DIAGNOSIS — M25.512 ACUTE PAIN OF LEFT SHOULDER: ICD-10-CM

## 2020-02-20 DIAGNOSIS — M25.552 LEFT HIP PAIN: ICD-10-CM

## 2020-02-20 DIAGNOSIS — M54.50 ACUTE BILATERAL LOW BACK PAIN WITHOUT SCIATICA: Primary | ICD-10-CM

## 2020-02-20 PROCEDURE — 97140 MANUAL THERAPY 1/> REGIONS: CPT | Performed by: PHYSICAL THERAPIST

## 2020-02-20 PROCEDURE — 97035 APP MDLTY 1+ULTRASOUND EA 15: CPT | Performed by: PHYSICAL THERAPIST

## 2020-02-20 PROCEDURE — 97014 ELECTRIC STIMULATION THERAPY: CPT | Performed by: PHYSICAL THERAPIST

## 2020-02-20 PROCEDURE — 97010 HOT OR COLD PACKS THERAPY: CPT | Performed by: PHYSICAL THERAPIST

## 2020-02-20 NOTE — PROGRESS NOTES
Daily Note     Today's date: 2020  Patient name: Gilma Gillespie  : 1970  MRN: [id number]  Referring provider: Cheo Ritchie MD  Dx:   Encounter Diagnosis     ICD-10-CM    1  Acute bilateral low back pain without sciatica M54 5    2  Acute pain of left shoulder M25 512    3  Left hip pain M25 552                   Subjective: Patient reports continued pain and difficulty sleeping  Seeing MD next week      Objective: See treatment diary below      Assessment: Tolerated treatment fair  Patient treated modalities only to decrease pain per MD   Will try ab stab next visit        Plan: Continue PT     Precautions: high intensity of pain      Manual              MFR 10'            traction                                                        Exercise Diary              bike                                                                                                                                                                                                                                                                        Modalities              Heat/stim 20            US 8'

## 2020-02-25 ENCOUNTER — OFFICE VISIT (OUTPATIENT)
Dept: PHYSICAL THERAPY | Facility: CLINIC | Age: 50
End: 2020-02-25
Payer: OTHER MISCELLANEOUS

## 2020-02-25 DIAGNOSIS — M25.552 LEFT HIP PAIN: ICD-10-CM

## 2020-02-25 DIAGNOSIS — M54.50 ACUTE BILATERAL LOW BACK PAIN WITHOUT SCIATICA: ICD-10-CM

## 2020-02-25 DIAGNOSIS — M25.512 ACUTE PAIN OF LEFT SHOULDER: Primary | ICD-10-CM

## 2020-02-25 PROCEDURE — 97014 ELECTRIC STIMULATION THERAPY: CPT

## 2020-02-25 PROCEDURE — 97140 MANUAL THERAPY 1/> REGIONS: CPT

## 2020-02-25 PROCEDURE — 97035 APP MDLTY 1+ULTRASOUND EA 15: CPT

## 2020-02-25 NOTE — PROGRESS NOTES
Daily Note     Today's date: 2020  Patient name: Nixon Kirk  : 1970  MRN: [id number]  Referring provider: Gema Casas MD  Dx:   Encounter Diagnosis     ICD-10-CM    1  Acute pain of left shoulder M25 512    2  Acute bilateral low back pain without sciatica M54 5    3  Left hip pain M25 552        Start Time: 1545  Stop Time: 1630  Total time in clinic (min): 45 minutes    Subjective: Patient stated that his pain in the shoulder is 9-10/10  Stated he is getting MRI tomorrow  Objective: See treatment diary below      Assessment: Patient tolerated treatment fair  Continued with regimen of modalities to decrease pain  MFR to decrease pain and tightness  Plan: Continue per plan of care        Precautions: high intensity of pain      Manual             MFR 10' 10'           traction                                                        Exercise Diary              bike                                                                                                                                                                                                                                                                        Modalities             Heat/stim 20 20'           US 8' 10'

## 2020-02-27 ENCOUNTER — OFFICE VISIT (OUTPATIENT)
Dept: PHYSICAL THERAPY | Facility: CLINIC | Age: 50
End: 2020-02-27
Payer: OTHER MISCELLANEOUS

## 2020-02-27 DIAGNOSIS — M25.512 ACUTE PAIN OF LEFT SHOULDER: ICD-10-CM

## 2020-02-27 DIAGNOSIS — M54.50 ACUTE BILATERAL LOW BACK PAIN WITHOUT SCIATICA: Primary | ICD-10-CM

## 2020-02-27 DIAGNOSIS — M25.552 LEFT HIP PAIN: ICD-10-CM

## 2020-02-27 PROCEDURE — 97035 APP MDLTY 1+ULTRASOUND EA 15: CPT

## 2020-02-27 PROCEDURE — 97140 MANUAL THERAPY 1/> REGIONS: CPT

## 2020-02-27 PROCEDURE — 97014 ELECTRIC STIMULATION THERAPY: CPT

## 2020-02-27 NOTE — PROGRESS NOTES
Daily Note     Today's date: 2020  Patient name: William Mesa  : 1970  MRN: [id number]  Referring provider: Gerardo Monge MD  Dx:   Encounter Diagnosis     ICD-10-CM    1  Acute bilateral low back pain without sciatica M54 5    2  Acute pain of left shoulder M25 512    3  Left hip pain M25 552                   Subjective: Pt received MRI yesterday and could only go through with 2 of them versus the full 4 he was supposed to receive; did not find the results yet  He reports 10/10 pain in lumbar spine  Objective: See treatment diary below      Assessment: Began with MH and e-stim to lumbar spine to decrease pain and relax surrounding musculature  MFR to lumbar spine to decrease pain and tissue tightness that was most prominent on L side  Ended with US for deep heating  Plan: Continue with current POC to address pt deficits        Precautions: high intensity of pain      Manual            MFR 10' 10' 10'          traction                                                        Exercise Diary              bike                                                                                                                                                                                                                                                                        Modalities            Heat/stim 20 20' 20'          US 8' 10' 10'

## 2020-03-03 ENCOUNTER — APPOINTMENT (OUTPATIENT)
Dept: PHYSICAL THERAPY | Facility: CLINIC | Age: 50
End: 2020-03-03
Payer: OTHER MISCELLANEOUS

## 2020-03-05 ENCOUNTER — OFFICE VISIT (OUTPATIENT)
Dept: PHYSICAL THERAPY | Facility: CLINIC | Age: 50
End: 2020-03-05
Payer: OTHER MISCELLANEOUS

## 2020-03-05 DIAGNOSIS — M54.50 ACUTE BILATERAL LOW BACK PAIN WITHOUT SCIATICA: Primary | ICD-10-CM

## 2020-03-05 DIAGNOSIS — M25.512 ACUTE PAIN OF LEFT SHOULDER: ICD-10-CM

## 2020-03-05 DIAGNOSIS — M25.552 LEFT HIP PAIN: ICD-10-CM

## 2020-03-05 PROCEDURE — 97110 THERAPEUTIC EXERCISES: CPT

## 2020-03-06 PROCEDURE — 97014 ELECTRIC STIMULATION THERAPY: CPT

## 2020-03-06 NOTE — PROGRESS NOTES
Daily Note     Today's date: 3/6/2020  Patient name: Leah Chaidez  : 1970  MRN: [id number]  Referring provider: Marixa Marquis MD  Dx:   Encounter Diagnosis     ICD-10-CM    1  Acute bilateral low back pain without sciatica M54 5    2  Acute pain of left shoulder M25 512    3  Left hip pain M25 552        Start Time: 1800  Stop Time: 1830  Total time in clinic (min): 30 minutes    Subjective: Pt reports 9/10 pain in lumbar spine, no change since starting therapy  Pt received results from MRI and has herniated disc  Objective: See treatment diary below      Assessment: Began with recumbent bike for warm up; tolerated well, decreased nawaf but did not increase pain f/b calf stretches on step  Pt informed treating therapist during calf stretches that he received an injection in LB earlier today; decided to d/c exercise and educated pt to avoid HP and no massage was to be performed as well as to avoid therapy for 48 hrs after receiving injection next time  CP and e-stim to lumbar spine to decrease pain  Plan: Continue with current POC to address pt deficits        Precautions: high intensity of pain      Manual            MFR 10' 10' 10'          traction                                                        Exercise Diary  3/5            bike 5'            CS 10"x5                                                                                                                                                                                                                                                          Modalities  2/20 2/25 2/27 3/5         Heat/stim 20 20' 20' stim 15'         US 8' 10' 10'          CP    15'

## 2020-03-09 ENCOUNTER — OFFICE VISIT (OUTPATIENT)
Dept: PHYSICAL THERAPY | Facility: CLINIC | Age: 50
End: 2020-03-09
Payer: OTHER MISCELLANEOUS

## 2020-03-09 DIAGNOSIS — M25.512 ACUTE PAIN OF LEFT SHOULDER: ICD-10-CM

## 2020-03-09 DIAGNOSIS — M25.552 LEFT HIP PAIN: ICD-10-CM

## 2020-03-09 DIAGNOSIS — M54.50 ACUTE BILATERAL LOW BACK PAIN WITHOUT SCIATICA: Primary | ICD-10-CM

## 2020-03-09 PROCEDURE — 97014 ELECTRIC STIMULATION THERAPY: CPT

## 2020-03-09 PROCEDURE — 97035 APP MDLTY 1+ULTRASOUND EA 15: CPT

## 2020-03-09 PROCEDURE — 97140 MANUAL THERAPY 1/> REGIONS: CPT

## 2020-03-09 PROCEDURE — 97110 THERAPEUTIC EXERCISES: CPT

## 2020-03-10 ENCOUNTER — OFFICE VISIT (OUTPATIENT)
Dept: PHYSICAL THERAPY | Facility: CLINIC | Age: 50
End: 2020-03-10
Payer: OTHER MISCELLANEOUS

## 2020-03-10 DIAGNOSIS — M25.512 ACUTE PAIN OF LEFT SHOULDER: ICD-10-CM

## 2020-03-10 DIAGNOSIS — M25.552 LEFT HIP PAIN: ICD-10-CM

## 2020-03-10 DIAGNOSIS — M54.50 ACUTE BILATERAL LOW BACK PAIN WITHOUT SCIATICA: Primary | ICD-10-CM

## 2020-03-10 PROCEDURE — 97116 GAIT TRAINING THERAPY: CPT

## 2020-03-10 PROCEDURE — 97014 ELECTRIC STIMULATION THERAPY: CPT

## 2020-03-10 PROCEDURE — 97110 THERAPEUTIC EXERCISES: CPT

## 2020-03-10 PROCEDURE — 97112 NEUROMUSCULAR REEDUCATION: CPT

## 2020-03-10 PROCEDURE — 97140 MANUAL THERAPY 1/> REGIONS: CPT

## 2020-03-10 NOTE — PROGRESS NOTES
Daily Note     Today's date: 3/10/2020  Patient name: Leah Chaidez  : 1970  MRN: [id number]  Referring provider: Marixa Marquis MD  Dx:   Encounter Diagnosis     ICD-10-CM    1  Acute bilateral low back pain without sciatica M54 5    2  Acute pain of left shoulder M25 512    3  Left hip pain M25 552                   Subjective: Pt reports feeling "a little better" today  He states he was able to sleep last night  Objective: See treatment diary below      Assessment: Verbal and visual cueing for correct exercise technique  Cueing during gait to increase heel strike  Brace worn during gym activities  Fair activation of TA with cueing; educated pt to continue working on at home  Most challenged by [facility] extension  MFR to decrease tissue tightness specifically to L paraspinals  Pt stated he felt he "did better this session "       Plan: Continue with current POC to address pt deficits        Precautions: high intensity of pain      Manual  2/20 2/25 2/27 3/9 3/10        MFR 10' 10' 10' 10' 10'        traction                                                        Exercise Diary  3/5 3/9 3/10          bike 5'            CS 10"x5 10"x10 10"x10          TM  5' 8' cueing for gait          Circuit   10x leg ext lvl 1 10x leg ext lvl 1          Ball Posture             Step Ups    2x10 ea 6"          Mini Squats              TG Squats              Devi Extension   10x prone on elbows          Core Stab    10'                                                                                                                                                Modalities  2/20 2/25 2/27 3/5 3/9 3/10       Heat/stim 20 20' 20' stim 15' 15' 10'       US 8' 10' 10'  10'        CP    15'

## 2020-03-17 ENCOUNTER — OFFICE VISIT (OUTPATIENT)
Dept: PHYSICAL THERAPY | Facility: CLINIC | Age: 50
End: 2020-03-17
Payer: OTHER MISCELLANEOUS

## 2020-03-17 DIAGNOSIS — M25.512 ACUTE PAIN OF LEFT SHOULDER: ICD-10-CM

## 2020-03-17 DIAGNOSIS — M25.552 LEFT HIP PAIN: ICD-10-CM

## 2020-03-17 DIAGNOSIS — M54.50 ACUTE BILATERAL LOW BACK PAIN WITHOUT SCIATICA: Primary | ICD-10-CM

## 2020-03-17 PROCEDURE — 97140 MANUAL THERAPY 1/> REGIONS: CPT

## 2020-03-17 PROCEDURE — 97112 NEUROMUSCULAR REEDUCATION: CPT

## 2020-03-17 PROCEDURE — 97014 ELECTRIC STIMULATION THERAPY: CPT

## 2020-03-17 PROCEDURE — 97110 THERAPEUTIC EXERCISES: CPT

## 2020-03-17 NOTE — PROGRESS NOTES
Daily Note     Today's date: 3/17/2020  Patient name: Colonel Gutierrez  : 1970  MRN: [id number]  Referring provider: Nathanael Rojas MD  Dx:   Encounter Diagnosis     ICD-10-CM    1  Acute bilateral low back pain without sciatica M54 5    2  Acute pain of left shoulder M25 512    3  Left hip pain M25 552                   Subjective: Pt reports no changes in low back symptoms; 10/10 pain  He did not wear his back brace today to therapy secondary to forgetting  He will be seeing MD this upcoming Thursday  He did not receive MRI yet  Objective: See treatment diary below      Assessment: Pt educated to bring brace next visit  Pt unable to perform mini squats secondary to increased pain  No handrail support needed for step ups  Progressed repetitions for leg extension  Cueing for neutral spine and TA activation during all exercises  Ended with MH and e-stim in seated to decrease pain  Plan: Continue with current POC to address pt deficits        Precautions: high intensity of pain      Manual  2/20 2/25 2/27 3/9 3/10 3/17       MFR 10' 10' 10' 10' 10' 10' prone       traction                                                        Exercise Diary  3/5 3/9 3/10 3/17         bike 5'            CS 10"x5 10"x10 10"x10 10"x10         TM  5' 8' cueing for gait 5'         Circuit   10x leg ext lvl 1 10x leg ext lvl 1 20x leg ext          Ball Posture             Step Ups    2x10 ea 6" 20x ea 6"         Mini Squats     5x pain         TG Squats              Devi Extension   10x prone on elbows 10x         Core Stab    10'                                                                                                                                                Modalities  2/20 2/25 2/27 3/5 3/9 3/10 3/17      Heat/stim 20 20' 20' stim 15' 15' 10' 15' seated      US 8' 10' 10'  10'        CP    15'

## 2020-03-19 ENCOUNTER — APPOINTMENT (OUTPATIENT)
Dept: PHYSICAL THERAPY | Facility: CLINIC | Age: 50
End: 2020-03-19
Payer: OTHER MISCELLANEOUS

## 2020-03-20 ENCOUNTER — APPOINTMENT (OUTPATIENT)
Dept: PHYSICAL THERAPY | Facility: CLINIC | Age: 50
End: 2020-03-20
Payer: OTHER MISCELLANEOUS

## 2020-03-24 ENCOUNTER — OFFICE VISIT (OUTPATIENT)
Dept: PHYSICAL THERAPY | Facility: CLINIC | Age: 50
End: 2020-03-24
Payer: OTHER MISCELLANEOUS

## 2020-03-24 DIAGNOSIS — M54.50 ACUTE BILATERAL LOW BACK PAIN WITHOUT SCIATICA: Primary | ICD-10-CM

## 2020-03-24 DIAGNOSIS — M25.552 LEFT HIP PAIN: ICD-10-CM

## 2020-03-24 DIAGNOSIS — M25.512 ACUTE PAIN OF LEFT SHOULDER: ICD-10-CM

## 2020-03-24 PROCEDURE — 97110 THERAPEUTIC EXERCISES: CPT | Performed by: PHYSICAL THERAPIST

## 2020-03-24 PROCEDURE — 97140 MANUAL THERAPY 1/> REGIONS: CPT

## 2020-03-24 PROCEDURE — 97014 ELECTRIC STIMULATION THERAPY: CPT

## 2020-03-24 NOTE — PROGRESS NOTES
Daily Note     Today's date: 3/24/2020  Patient name: Deborah Vides  : 1970  MRN: [id number]  Referring provider: Edmond Grant MD  Dx:   Encounter Diagnosis     ICD-10-CM    1  Acute bilateral low back pain without sciatica M54 5    2  Acute pain of left shoulder M25 512    3  Left hip pain M25 552                   Subjective: Pt reports no change in low back 10/10 pain  He has not been compliant with HEP  Objective: See treatment diary below      Assessment: Pt had a doctors appointment scheduled for 12:15 and requested just MH and e-stim to lumbar spine; denied performing any exercise until MRI is performed  After speaking with primary therapist pt will continue with therapy  Pt educated on herniated disc anatomy, healing process, and benefits of performing exercise  ME to realign pt hips  Pt educated to continue with HEP stretches  Plan: Continue with current POC to address pt deficits        Precautions: high intensity of pain      Manual  2/20 2/25 2/27 3/9 3/10 3/17 3/24      MFR 10' 10' 10' 10' 10' 10' prone       traction             ME       10'                                    Exercise Diary  3/5 3/9 3/10 3/17 3/24        bike 5'            CS 10"x5 10"x10 10"x10 10"x10         TM  5' 8' cueing for gait 5'         Circuit   10x leg ext lvl 1 10x leg ext lvl 1 20x leg ext          Ball Posture             Step Ups    2x10 ea 6" 20x ea 6"         Mini Squats     5x pain         TG Squats              Devi Extension   10x prone on elbows 10x         Core Stab    10'          Pt edu     10'                                                                                                                                 Modalities  2/20 2/25 2/27 3/5 3/9 3/10 3/17 3/24     Heat/stim 20 20' 20' stim 15' 15' 10' 15' seated 15' seated     US 8' 10' 10'  10'        CP    15'

## 2020-03-26 ENCOUNTER — OFFICE VISIT (OUTPATIENT)
Dept: PHYSICAL THERAPY | Facility: CLINIC | Age: 50
End: 2020-03-26
Payer: OTHER MISCELLANEOUS

## 2020-03-26 DIAGNOSIS — M25.552 LEFT HIP PAIN: ICD-10-CM

## 2020-03-26 DIAGNOSIS — M54.50 ACUTE BILATERAL LOW BACK PAIN WITHOUT SCIATICA: Primary | ICD-10-CM

## 2020-03-26 DIAGNOSIS — M25.512 ACUTE PAIN OF LEFT SHOULDER: ICD-10-CM

## 2020-03-26 PROCEDURE — 97112 NEUROMUSCULAR REEDUCATION: CPT

## 2020-03-26 PROCEDURE — 97116 GAIT TRAINING THERAPY: CPT

## 2020-03-26 PROCEDURE — 97014 ELECTRIC STIMULATION THERAPY: CPT

## 2020-03-26 PROCEDURE — 97110 THERAPEUTIC EXERCISES: CPT

## 2020-03-26 NOTE — PROGRESS NOTES
Daily Note     Today's date: 3/26/2020  Patient name: María Bryan  : 1970  MRN: [id number]  Referring provider: Aditi Yepez MD  Dx:   Encounter Diagnosis     ICD-10-CM    1  Acute bilateral low back pain without sciatica M54 5    2  Acute pain of left shoulder M25 512    3  Left hip pain M25 552        Start Time: 1000  Stop Time: 1100  Total time in clinic (min): 60 minutes    Subjective: Patient reports pain in back has decreased to 6/10 pain  Objective: See treatment diary below      Assessment: Continuous cueing and patient education on engaging core muscles to provide more back stability and decrease pain throughout gait and circuit machines  Patient was able to tolerate 10 min on TM at a slow pace w/ good gait pattern  Patient tolerated other exercises well  Applied MH/stim to back in sitting position to decrease tightness and pain  Plan: Continue per plan of care  Precautions: high intensity of pain      Manual  2/20 2/25 2/27 3/9 3/10 3/17 3/24      MFR 10' 10' 10' 10' 10' 10' prone       traction             ME       10'                                    Exercise Diary  3/5 3/9 3/10 3/17 3/24 3/26       bike 5'            CS 10"x5 10"x10 10"x10 10"x10  10x       TM  5' 8' cueing for gait 5'  10' cueing for posture       Circuit   10x leg ext lvl 1 10x leg ext lvl 1 20x leg ext   20x 1st row, cueing for core stab          Ball Posture             Step Ups    2x10 ea 6" 20x ea 6"         Mini Squats     5x pain         TG Squats              Devi Extension   10x prone on elbows 10x         Core Stab/ posture   10'   10'       Pt edu     10'                                                                                                                                 Modalities  2/20 2/25 2/27 3/5 3/9 3/10 3/17 3/24 3/26    Heat/stim 20 20' 20' stim 15' 15' 10' 15' seated 15' seated 15' seated    US 8' 10' 10'  10'        CP    15'

## 2020-03-31 ENCOUNTER — OFFICE VISIT (OUTPATIENT)
Dept: PHYSICAL THERAPY | Facility: CLINIC | Age: 50
End: 2020-03-31
Payer: OTHER MISCELLANEOUS

## 2020-03-31 DIAGNOSIS — M54.50 ACUTE BILATERAL LOW BACK PAIN WITHOUT SCIATICA: Primary | ICD-10-CM

## 2020-03-31 DIAGNOSIS — M25.512 ACUTE PAIN OF LEFT SHOULDER: ICD-10-CM

## 2020-03-31 DIAGNOSIS — M25.552 LEFT HIP PAIN: ICD-10-CM

## 2020-03-31 PROCEDURE — 97112 NEUROMUSCULAR REEDUCATION: CPT

## 2020-03-31 PROCEDURE — 97110 THERAPEUTIC EXERCISES: CPT

## 2020-03-31 PROCEDURE — 97014 ELECTRIC STIMULATION THERAPY: CPT

## 2020-03-31 PROCEDURE — 97116 GAIT TRAINING THERAPY: CPT

## 2020-03-31 NOTE — PROGRESS NOTES
Progress Note     Today's date: 3/31/2020  Patient name: William Mesa  : 1970  MRN: [id number]  Referring provider: Gerardo Monge MD  Dx:   Encounter Diagnosis     ICD-10-CM    1  Acute bilateral low back pain without sciatica M54 5    2  Acute pain of left shoulder M25 512    3  Left hip pain M25 552        Start Time: 1200  Stop Time: 1300  Total time in clinic (min): 60 minutes    Subjective: Patient stated his back pain is 6/10 today  Objective: See treatment diary below    Cervical     Flexion:  WFL  Extension:  Restriction level: minimal  Left rotation:  Restriction level: WNL  Right rotation:  Restriction level: WNL  Left Shoulder   Flexion: 130 degrees   Abduction: 125 degrees   External rotation BTH: C7   Internal rotation BTB: T11     Right Shoulder   Flexion: 130 degrees   Abduction: 125 degrees   External rotation BTH: C7   Internal rotation BTB: T11     Lumbar   Flexion:  Restriction level: moderate  Extension:  Restriction level: maximal  Left rotation:  Restriction level: minimal  Right rotation:  Restriction level: Minimal     Strength/Myotome Testing     Left Shoulder      Planes of Motion   Flexion: 4+   Abduction: 4+   External rotation at 0°: 5     Right Shoulder      Planes of Motion   Flexion: 4+   Abduction: 4+   External rotation at 0°: 4+     Assessment: Patient has met 50% of STG and has not met any LTG  Pain w/ sit-stand is 8 5/10  Patient can only sit/stand/walk for a few minutes w/o needing a break or changing position  BL shoulder ROM and strength has improved  Patient has not returned to work yet  Patient will benefit from continued skilled PT to continue improving current deficits  Patient tolerated exercises fair  C/o back pain w/ calf stretches  Education of continuing to engage core muscles throughout exercises       3 weeks  Sit to stand without 10/10 pain--MET 50%  ROM shoulder flexion to functional limits--MET 50%  6 weeks  Decrease pain to minimal for return to work--NOT MET  Normal lumbar ROM--NOT MET  Normal shoulder strength---NOT MET  Able to walk, sit and stand for 30 minutes---NOT MET  Able to return to work---NOT MET    Plan: Continue per plan of care  as per MD prescription  Precautions: high intensity of pain      Manual  2/20 2/25 2/27 3/9 3/10 3/17 3/24      MFR 10' 10' 10' 10' 10' 10' prone       traction             ME       10'                                    Exercise Diary  3/5 3/9 3/10 3/17 3/24 3/26 3/31      bike 5'            CS 10"x5 10"x10 10"x10 10"x10  10x 10x      TM  5' 8' cueing for gait 5'  10' cueing for posture 10'      Circuit   10x leg ext lvl 1 10x leg ext lvl 1 20x leg ext   20x 1st row, cueing for core stab    Full 20x      Ball Posture             Step Ups    2x10 ea 6" 20x ea 6"         Mini Squats     5x pain         TG Squats              Devi Extension   10x prone on elbows 10x         Core Stab/ posture   10'   10' 10'      Pt edu     10'                                                                                                                                 Modalities  2/20 2/25 2/27 3/5 3/9 3/10 3/17 3/24 3/26 3/31   Heat/stim 20 20' 20' stim 15' 15' 10' 15' seated 15' seated 15' seated 15' seated   US 8' 10' 10'  10'        CP    15'

## 2020-04-02 ENCOUNTER — OFFICE VISIT (OUTPATIENT)
Dept: PHYSICAL THERAPY | Facility: CLINIC | Age: 50
End: 2020-04-02
Payer: OTHER MISCELLANEOUS

## 2020-04-02 DIAGNOSIS — M54.50 ACUTE BILATERAL LOW BACK PAIN WITHOUT SCIATICA: Primary | ICD-10-CM

## 2020-04-02 DIAGNOSIS — M25.552 LEFT HIP PAIN: ICD-10-CM

## 2020-04-02 DIAGNOSIS — M25.512 ACUTE PAIN OF LEFT SHOULDER: ICD-10-CM

## 2020-04-02 PROCEDURE — 97014 ELECTRIC STIMULATION THERAPY: CPT

## 2020-04-02 PROCEDURE — 97112 NEUROMUSCULAR REEDUCATION: CPT

## 2020-04-02 PROCEDURE — 97110 THERAPEUTIC EXERCISES: CPT

## 2020-04-07 ENCOUNTER — OFFICE VISIT (OUTPATIENT)
Dept: PHYSICAL THERAPY | Facility: CLINIC | Age: 50
End: 2020-04-07
Payer: OTHER MISCELLANEOUS

## 2020-04-07 DIAGNOSIS — M54.50 ACUTE BILATERAL LOW BACK PAIN WITHOUT SCIATICA: Primary | ICD-10-CM

## 2020-04-07 DIAGNOSIS — M25.512 ACUTE PAIN OF LEFT SHOULDER: ICD-10-CM

## 2020-04-07 DIAGNOSIS — M25.552 LEFT HIP PAIN: ICD-10-CM

## 2020-04-07 PROCEDURE — 97112 NEUROMUSCULAR REEDUCATION: CPT

## 2020-04-07 PROCEDURE — 97110 THERAPEUTIC EXERCISES: CPT

## 2020-04-07 PROCEDURE — 97116 GAIT TRAINING THERAPY: CPT

## 2020-04-07 PROCEDURE — 97014 ELECTRIC STIMULATION THERAPY: CPT

## 2020-04-09 ENCOUNTER — OFFICE VISIT (OUTPATIENT)
Dept: PHYSICAL THERAPY | Facility: CLINIC | Age: 50
End: 2020-04-09
Payer: OTHER MISCELLANEOUS

## 2020-04-09 DIAGNOSIS — M25.552 LEFT HIP PAIN: ICD-10-CM

## 2020-04-09 DIAGNOSIS — M54.50 ACUTE BILATERAL LOW BACK PAIN WITHOUT SCIATICA: Primary | ICD-10-CM

## 2020-04-09 DIAGNOSIS — M25.512 ACUTE PAIN OF LEFT SHOULDER: ICD-10-CM

## 2020-04-09 PROCEDURE — 97110 THERAPEUTIC EXERCISES: CPT

## 2020-04-09 PROCEDURE — 97140 MANUAL THERAPY 1/> REGIONS: CPT

## 2020-04-09 PROCEDURE — 97112 NEUROMUSCULAR REEDUCATION: CPT

## 2020-04-09 PROCEDURE — 97014 ELECTRIC STIMULATION THERAPY: CPT

## 2020-04-14 ENCOUNTER — OFFICE VISIT (OUTPATIENT)
Dept: PHYSICAL THERAPY | Facility: CLINIC | Age: 50
End: 2020-04-14
Payer: OTHER MISCELLANEOUS

## 2020-04-14 DIAGNOSIS — M54.50 ACUTE BILATERAL LOW BACK PAIN WITHOUT SCIATICA: Primary | ICD-10-CM

## 2020-04-14 DIAGNOSIS — M25.552 LEFT HIP PAIN: ICD-10-CM

## 2020-04-14 DIAGNOSIS — M25.512 ACUTE PAIN OF LEFT SHOULDER: ICD-10-CM

## 2020-04-14 PROCEDURE — 97112 NEUROMUSCULAR REEDUCATION: CPT

## 2020-04-14 PROCEDURE — 97140 MANUAL THERAPY 1/> REGIONS: CPT

## 2020-04-14 PROCEDURE — 97014 ELECTRIC STIMULATION THERAPY: CPT

## 2020-04-14 PROCEDURE — 97110 THERAPEUTIC EXERCISES: CPT

## 2020-04-16 ENCOUNTER — OFFICE VISIT (OUTPATIENT)
Dept: PHYSICAL THERAPY | Facility: CLINIC | Age: 50
End: 2020-04-16
Payer: OTHER MISCELLANEOUS

## 2020-04-16 DIAGNOSIS — M25.512 ACUTE PAIN OF LEFT SHOULDER: ICD-10-CM

## 2020-04-16 DIAGNOSIS — M54.50 ACUTE BILATERAL LOW BACK PAIN WITHOUT SCIATICA: Primary | ICD-10-CM

## 2020-04-16 DIAGNOSIS — M25.552 LEFT HIP PAIN: ICD-10-CM

## 2020-04-16 PROCEDURE — 97014 ELECTRIC STIMULATION THERAPY: CPT

## 2020-04-16 PROCEDURE — 97110 THERAPEUTIC EXERCISES: CPT

## 2020-04-16 PROCEDURE — 97112 NEUROMUSCULAR REEDUCATION: CPT

## 2020-04-21 ENCOUNTER — APPOINTMENT (OUTPATIENT)
Dept: PHYSICAL THERAPY | Facility: CLINIC | Age: 50
End: 2020-04-21
Payer: OTHER MISCELLANEOUS

## 2020-04-23 ENCOUNTER — APPOINTMENT (OUTPATIENT)
Dept: PHYSICAL THERAPY | Facility: CLINIC | Age: 50
End: 2020-04-23
Payer: OTHER MISCELLANEOUS

## 2020-05-13 ENCOUNTER — APPOINTMENT (OUTPATIENT)
Dept: PHYSICAL THERAPY | Facility: CLINIC | Age: 50
End: 2020-05-13
Payer: OTHER MISCELLANEOUS

## 2020-05-18 ENCOUNTER — TRANSCRIBE ORDERS (OUTPATIENT)
Dept: PHYSICAL THERAPY | Facility: CLINIC | Age: 50
End: 2020-05-18

## 2020-05-18 ENCOUNTER — EVALUATION (OUTPATIENT)
Dept: PHYSICAL THERAPY | Facility: CLINIC | Age: 50
End: 2020-05-18
Payer: OTHER MISCELLANEOUS

## 2020-05-18 DIAGNOSIS — M54.50 ACUTE BILATERAL LOW BACK PAIN WITHOUT SCIATICA: Primary | ICD-10-CM

## 2020-05-18 DIAGNOSIS — M25.512 ACUTE PAIN OF LEFT SHOULDER: ICD-10-CM

## 2020-05-18 DIAGNOSIS — M25.552 LEFT HIP PAIN: ICD-10-CM

## 2020-05-18 PROCEDURE — 97035 APP MDLTY 1+ULTRASOUND EA 15: CPT | Performed by: PHYSICAL THERAPIST

## 2020-05-18 PROCEDURE — 97140 MANUAL THERAPY 1/> REGIONS: CPT | Performed by: PHYSICAL THERAPIST

## 2020-05-18 PROCEDURE — 97032 APPL MODALITY 1+ESTIM EA 15: CPT | Performed by: PHYSICAL THERAPIST

## 2020-05-20 ENCOUNTER — OFFICE VISIT (OUTPATIENT)
Dept: PHYSICAL THERAPY | Facility: CLINIC | Age: 50
End: 2020-05-20
Payer: OTHER MISCELLANEOUS

## 2020-05-20 DIAGNOSIS — M54.50 ACUTE BILATERAL LOW BACK PAIN WITHOUT SCIATICA: Primary | ICD-10-CM

## 2020-05-20 DIAGNOSIS — M25.512 ACUTE PAIN OF LEFT SHOULDER: ICD-10-CM

## 2020-05-20 DIAGNOSIS — M25.552 LEFT HIP PAIN: ICD-10-CM

## 2020-05-20 PROCEDURE — 97032 APPL MODALITY 1+ESTIM EA 15: CPT | Performed by: PHYSICAL THERAPIST

## 2020-05-20 PROCEDURE — 97530 THERAPEUTIC ACTIVITIES: CPT | Performed by: PHYSICAL THERAPIST

## 2020-05-20 PROCEDURE — 97035 APP MDLTY 1+ULTRASOUND EA 15: CPT | Performed by: PHYSICAL THERAPIST

## 2020-05-20 PROCEDURE — 97116 GAIT TRAINING THERAPY: CPT | Performed by: PHYSICAL THERAPIST

## 2020-05-26 ENCOUNTER — OFFICE VISIT (OUTPATIENT)
Dept: PHYSICAL THERAPY | Facility: CLINIC | Age: 50
End: 2020-05-26
Payer: OTHER MISCELLANEOUS

## 2020-05-26 DIAGNOSIS — M25.552 LEFT HIP PAIN: ICD-10-CM

## 2020-05-26 DIAGNOSIS — M54.50 ACUTE BILATERAL LOW BACK PAIN WITHOUT SCIATICA: Primary | ICD-10-CM

## 2020-05-26 DIAGNOSIS — M25.512 ACUTE PAIN OF LEFT SHOULDER: ICD-10-CM

## 2020-05-28 ENCOUNTER — APPOINTMENT (OUTPATIENT)
Dept: PHYSICAL THERAPY | Facility: CLINIC | Age: 50
End: 2020-05-28
Payer: OTHER MISCELLANEOUS

## 2020-09-30 NOTE — PROGRESS NOTES
Daily Note     Today's date: 3/9/2020  Patient name: Cecile Ma  : 1970  MRN: [id number]  Referring provider: Hetal Dunlap MD  Dx:   Encounter Diagnosis     ICD-10-CM    1  Acute bilateral low back pain without sciatica M54 5    2  Acute pain of left shoulder M25 512    3  Left hip pain M25 552                   Subjective: Pt reports some relief from injection over the weekend but has since subsided  He will be getting an MRI of thoracic spine later in the week  Objective: See treatment diary below       Assessment: Attempted exercise today while wearing back brace, no increases in pain with TM or calf stretches but could not tolerate LE circuit exercises today secondary to increasing symptoms  MFR to decrease BL paraspinal tightness; modalities to decrease pain and relax musculature  Plan: Continue with current POC to address pt deficits        Precautions: high intensity of pain      Manual  2/20 2/25 2/27 3/9         MFR 10' 10' 10' 10'         traction                                                        Exercise Diary  3/5 3/9           bike 5'            CS 10"x5 10"x10           TM  5'           Circuit   10x leg ext lvl 1           Ball Posture             Step Ups              Mini Squats              TG [place] Extension                                                                                                                                                                Modalities  2/20 2/25 2/27 3/5 3/9        Heat/stim 20 20' 20' stim 15' 15'        US 8' 10' 10'  10'        CP    15' Pt slept greater than 5 hours on most rounds.  No observed or reported signs or symptoms of distress.    Will continue to monitor.

## 2021-03-30 DIAGNOSIS — Z23 ENCOUNTER FOR IMMUNIZATION: ICD-10-CM

## 2021-06-03 ENCOUNTER — TRANSCRIBE ORDERS (OUTPATIENT)
Dept: PHYSICAL THERAPY | Facility: CLINIC | Age: 51
End: 2021-06-03

## 2021-06-03 ENCOUNTER — EVALUATION (OUTPATIENT)
Dept: PHYSICAL THERAPY | Facility: CLINIC | Age: 51
End: 2021-06-03
Payer: OTHER MISCELLANEOUS

## 2021-06-03 DIAGNOSIS — G89.29 CHRONIC LEFT SHOULDER PAIN: Primary | ICD-10-CM

## 2021-06-03 DIAGNOSIS — M25.512 CHRONIC LEFT SHOULDER PAIN: Primary | ICD-10-CM

## 2021-06-03 PROCEDURE — 97110 THERAPEUTIC EXERCISES: CPT | Performed by: PHYSICAL THERAPIST

## 2021-06-03 PROCEDURE — 97161 PT EVAL LOW COMPLEX 20 MIN: CPT | Performed by: PHYSICAL THERAPIST

## 2021-06-03 NOTE — LETTER
Ayde 3, 2021    Laney Newman MD   [facility] [address]    Patient: Saroj Vaughan   YOB: 1970   Date of Visit: 6/3/2021     Encounter Diagnosis     ICD-10-CM    1  Chronic left shoulder pain  M25 512     G89 29        Dear Dr Cherry Blend:    Thank you for your recent referral of Saroj Vaughan  Please review the attached evaluation summary from [facility]'s recent visit  Please verify that you agree with the plan of care by signing the attached order  If you have any questions or concerns, please do not hesitate to call  I sincerely appreciate the opportunity to share in the care of one of your patients and hope to have another opportunity to work with you in the near future  Sincerely,    Felisa Marx, PT      Referring Provider:      I certify that I have read the below Plan of Care and certify the need for these services furnished under this plan of treatment while under my care  Laney Newman MD  [address] [place] [address]  Via Fax: [phone]          PT Evaluation     Today's date: 6/3/2021  Patient name: Saroj Vaughan  : 1970  MRN: [id number]  Referring provider: Sherryle Brightly, MD  Dx:   Encounter Diagnosis     ICD-10-CM    1   Chronic left shoulder pain  M25 512     G89 29        Start Time: 1415  Stop Time: 1500  Total time in clinic (min): 45 minutes    Assessment  Assessment details: Patient presents with chronic L shoulder pain, secondary to injury at work ; demonstrates FHRS, (+) BL UT myofascial tightness, decreased Rom and Strength, reports difficulty sleeping, lifting; patient would benefit from therapeutic exercise, manual therapy, patient education regarding posture and proper lifting techniques; patient issued HEP  Impairments: abnormal muscle tone, abnormal or restricted ROM, impaired physical strength and pain with function  Understanding of Dx/Px/POC: good   Prognosis: good    Goals  STGs  1  Decrease pain 50% in 2 weeks  2  Increase Rom 50% in 2 weeks  3  Increase Strength half grade in 2 weeks  4  Compliant with HEP in 2 weeks  LTGs  1  Decrease pain to mild to none in 4 weeks  2  Increase Rom WNL in 4 weeks  3  Increase Strength WNL in 4 weeks  4  Able to sleep with mild to no difficulty in 4 weeks  5  Able to lift with mild to no difficulty in 4 weeks    Plan  Patient would benefit from: skilled physical therapy  Planned modality interventions: hydrotherapy  Planned therapy interventions: joint mobilization, manual therapy, neuromuscular re-education, postural training, therapeutic activities, therapeutic exercise, home exercise program and functional ROM exercises  Frequency: 2x week  Duration in weeks: 4        Subjective Evaluation    History of Present Illness  Mechanism of injury: Chronic, secondary to injury at work 2020          Not a recurrent problem   Quality of life: good    Pain  Current pain ratin  At best pain ratin  At worst pain ratin  Quality: throbbing  Relieving factors: relaxation  Aggravating factors: lifting    Social Support  Lives with: spouse and young children    Employment status: not working  Hand dominance: right      Diagnostic Tests  No diagnostic tests performed  Treatments  Previous treatment: physical therapy  Patient Goals  Patient goals for therapy: increased strength, decreased pain and increased motion  Patient goal: lift, sleep        Objective     Postural Observations    Additional Postural Observation Details  FHRS    Palpation   Left   Hypertonic in the upper trapezius  Right   Hypertonic in the upper trapezius       Active Range of Motion   Left Shoulder   Flexion: 35 degrees   Extension: 38 degrees   Abduction: 50 degrees   External rotation 0°: 40 degrees   Internal rotation BTB: sacrum     Strength/Myotome Testing     Left Shoulder     Planes of Motion   Flexion: 3-   Extension: 4-   Abduction: 3-   External rotation at 0°: 4-   Internal rotation at 0°: 4-              Precautions: none      Manual 6/3            MFR             Jt Mobs             vib/mass                          Neuro Re-Ed             Prone retract             Prone abd             Prone ext             [place] 3-way                                                    Ther Ex             ube             pulleys             Doorway stretch             TB - all planes             grippers             Pt ed - HEP 5            Prom 10                         Ther Activity             lift                          Gait Training                                       Modalities             MH

## 2021-06-09 ENCOUNTER — OFFICE VISIT (OUTPATIENT)
Dept: PHYSICAL THERAPY | Facility: CLINIC | Age: 51
End: 2021-06-09
Payer: OTHER MISCELLANEOUS

## 2021-06-09 DIAGNOSIS — M25.512 CHRONIC LEFT SHOULDER PAIN: Primary | ICD-10-CM

## 2021-06-09 DIAGNOSIS — G89.29 CHRONIC LEFT SHOULDER PAIN: Primary | ICD-10-CM

## 2021-06-09 PROCEDURE — 97110 THERAPEUTIC EXERCISES: CPT

## 2021-06-09 NOTE — PROGRESS NOTES
Daily Note     Today's date: 2021  Patient name: Mimi Tapia  : 1970  MRN: [id number]  Referring provider: Giulia Gillespie MD  Dx:   Encounter Diagnosis     ICD-10-CM    1  Chronic left shoulder pain  M25 512     G89 29                   Subjective: Pt reports feeling sore in shoulder today  Objective: See treatment diary below      Assessment: Tolerated treatment well  Patient with limited and painful ROM in L shoulder all planes  Pt has difficulty with all ex due to pain in L shoulder  Pt with weakness in L shoulder  Pt with tenderness to palpation in [facility] [place] joint  Pt would benefit from continued therapy for strengthening for improving functional mobility  Plan: Continue per plan of care        Precautions: none      Manual 6/3 6/9           MFR             Jt Mobs             vib/mass             PROM L shoulder  TK 10'           Neuro Re-Ed             Prone retract             Prone abd             Prone ext  10x           [place] 3-way                                                    Ther Ex             ube  3'fwd/3'bkw           pulleys  3' FF           Doorway stretch  15" x4           TB - all planes             grippers             Pt ed - HEP 5            Prom 10                         Ther Activity             lift                          Gait Training                                       Modalities                          CP  10'

## 2021-06-11 ENCOUNTER — OFFICE VISIT (OUTPATIENT)
Dept: PHYSICAL THERAPY | Facility: CLINIC | Age: 51
End: 2021-06-11
Payer: OTHER MISCELLANEOUS

## 2021-06-11 DIAGNOSIS — G89.29 CHRONIC LEFT SHOULDER PAIN: Primary | ICD-10-CM

## 2021-06-11 DIAGNOSIS — M25.512 CHRONIC LEFT SHOULDER PAIN: Primary | ICD-10-CM

## 2021-06-11 PROCEDURE — 97110 THERAPEUTIC EXERCISES: CPT

## 2021-06-11 PROCEDURE — 97140 MANUAL THERAPY 1/> REGIONS: CPT

## 2021-06-11 NOTE — PROGRESS NOTES
Daily Note     Today's date: 2021  Patient name: Saroj Vaughan  : 1970  MRN: [id number]  Referring provider: Sherryle Brightly, MD  Dx:   Encounter Diagnosis     ICD-10-CM    1  Chronic left shoulder pain  M25 512     G89 29        Start Time: 1055  Stop Time: 1145  Total time in clinic (min): 50 minutes    Subjective: Patient stated he feels clicking and pain in front of shoulder w/ certain movements  Objective: See treatment diary below      Assessment: Patient tolerated session well  This session we added RTC strengthening w/o difficulty  Cueing for proper posture  Improved mobility in shoulder in all planes post PROM  Plan: Continue per plan of care        Precautions: none      Manual 6/3 6/9 6/11          MFR             Jt Mobs             vib/mass             PROM L shoulder  TK 10' [facility]          Neuro Re-Ed             Prone retract             Prone abd             Prone ext  10x           [place] 3-way                                                    Ther Ex             ube  3'fwd/3'bkw 4/4          pulleys  3' FF 5'          Doorway stretch  15" x4 15" 4x          TB - all planes   YTB 20x ea          grippers             Pt ed - HEP 5            Prom 10            Supine pec stretch half roll   15" 3x          T/s extension over roll    5" 10x                                    Ther Activity             lift                          Gait Training                                       Modalities                          CP  10'

## 2021-06-14 ENCOUNTER — OFFICE VISIT (OUTPATIENT)
Dept: PHYSICAL THERAPY | Facility: CLINIC | Age: 51
End: 2021-06-14
Payer: OTHER MISCELLANEOUS

## 2021-06-14 DIAGNOSIS — M25.512 CHRONIC LEFT SHOULDER PAIN: Primary | ICD-10-CM

## 2021-06-14 DIAGNOSIS — G89.29 CHRONIC LEFT SHOULDER PAIN: Primary | ICD-10-CM

## 2021-06-14 PROCEDURE — 97140 MANUAL THERAPY 1/> REGIONS: CPT

## 2021-06-14 PROCEDURE — 97110 THERAPEUTIC EXERCISES: CPT

## 2021-06-14 PROCEDURE — 97112 NEUROMUSCULAR REEDUCATION: CPT

## 2021-06-14 NOTE — PROGRESS NOTES
Daily Note     Today's date: 2021  Patient name: Heath Corral  : 1970  MRN: [id number]  Referring provider: Kenyatta Dickinson MD  Dx:   Encounter Diagnosis     ICD-10-CM    1  Chronic left shoulder pain  M25 512     G89 29        Start Time: 1630  Stop Time: 1715  Total time in clinic (min): 45 minutes    Subjective: Patient reports he was sore post last session  Stated that he feels better today, a little looser  Patient stated he feels "catching" in shoulder w/ certain movements  Objective: See treatment diary below      Assessment: Patient tolerated session well  This session we added prone scap stab exercises w/ cueing for proper technique  Muscular fatigue noted w/ prone extension  Pain reports in [facility] joint area w/ prone rows  Plan: Continue per plan of care        Precautions: none      Manual 6/3 6/9 6/11 6/14         MFR             Jt Mobs             vib/mass             PROM L shoulder  TK 10' [facility] [place] 10' + MFR         Neuro Re-Ed             Prone retract    Row 10x         Prone abd             Prone ext  10x  10x         Ly 3-way                                                    Ther Ex             ube  3'fwd/3'bkw          pulleys  3' FF 5' 5'         Doorway stretch  15" x4 15" 4x 30" 3x         TB - all planes   YTB 20x ea YTB 20x ea         grippers             Pt ed - HEP 5            Prom 10            Supine pec stretch half roll   15" 3x          T/s extension over roll    5" 10x 10" 10x                                   Ther Activity             lift                          Gait Training                                       Modalities                          CP  10'

## 2021-06-16 ENCOUNTER — OFFICE VISIT (OUTPATIENT)
Dept: PHYSICAL THERAPY | Facility: CLINIC | Age: 51
End: 2021-06-16
Payer: OTHER MISCELLANEOUS

## 2021-06-16 DIAGNOSIS — G89.29 CHRONIC LEFT SHOULDER PAIN: Primary | ICD-10-CM

## 2021-06-16 DIAGNOSIS — M25.512 CHRONIC LEFT SHOULDER PAIN: Primary | ICD-10-CM

## 2021-06-16 PROCEDURE — 97112 NEUROMUSCULAR REEDUCATION: CPT

## 2021-06-16 PROCEDURE — 97110 THERAPEUTIC EXERCISES: CPT

## 2021-06-16 PROCEDURE — 97140 MANUAL THERAPY 1/> REGIONS: CPT

## 2021-06-16 NOTE — PROGRESS NOTES
Daily Note     Today's date: 2021  Patient name: Heather Del Valle  : 1970  MRN: [id number]  Referring provider: Makenzie Miller MD  Dx:   Encounter Diagnosis     ICD-10-CM    1  Chronic left shoulder pain  M25 512     G89 29        Start Time:   Stop Time:   Total time in clinic (min): 40 minutes    Subjective: Patient reports his pain in the shoulder is 7/10  Patient stated he felt a little better post session  Continued "popping" in front of shoulder with certain movements  Objective: See treatment diary below      Assessment: Tolerated treatment well  Patient demonstrated fatigue post treatment and would benefit from continued skilled PT  Fwd rolled shoulder on L side due to tight pec, reduced tightness and improve shoulder mobility post MFR to pec  Improved shoulder PROM in all planes  Fatigue and pain in shoulder b/l side w/ LT raises on wall  Plan: Continue per plan of care  Precautions: none      Manual 6/3 6/9 6/11 6/14 6/16        MFR             Jt Mobs             vib/mass             PROM L shoulder  TK 10' JH 10' + MFR 10' + mfR to pec        Neuro Re-Ed             Prone retract    Row 10x         Prone abd             Prone ext  10x  10x         Ly 3-way     Rows 15# 20x                                               Ther Ex             ube  3'fwd/3'bkw         pulleys  3' FF 5' 5' 5'        Doorway stretch  15" x4 15" 4x 30" 3x 30" 3x        TB - all planes   YTB 20x ea YTB 20x ea YTB 20x ea        grippers             Pt ed - HEP 5            Prom 10            Supine pec stretch half roll   15" 3x          T/s extension over roll    5" 10x 10" 10x 10" 10x        LT raise on wall w/ foam roll     5x fatigue, p!                      Ther Activity             lift                          Gait Training                                       Modalities                          CP  10'

## 2021-06-21 ENCOUNTER — OFFICE VISIT (OUTPATIENT)
Dept: PHYSICAL THERAPY | Facility: CLINIC | Age: 51
End: 2021-06-21
Payer: OTHER MISCELLANEOUS

## 2021-06-21 DIAGNOSIS — M25.512 CHRONIC LEFT SHOULDER PAIN: Primary | ICD-10-CM

## 2021-06-21 DIAGNOSIS — G89.29 CHRONIC LEFT SHOULDER PAIN: Primary | ICD-10-CM

## 2021-06-21 PROCEDURE — 97140 MANUAL THERAPY 1/> REGIONS: CPT

## 2021-06-21 PROCEDURE — 97110 THERAPEUTIC EXERCISES: CPT

## 2021-06-21 PROCEDURE — 97112 NEUROMUSCULAR REEDUCATION: CPT

## 2021-06-21 PROCEDURE — 97530 THERAPEUTIC ACTIVITIES: CPT

## 2021-06-21 NOTE — PROGRESS NOTES
Daily Note     Today's date: 2021  Patient name: Shiloh Tracy  : 1970  MRN: [id number]  Referring provider: Sameera De León MD  Dx:   Encounter Diagnosis     ICD-10-CM    1  Chronic left shoulder pain  M25 512     G89 29        Start Time: 1715  Stop Time: 1800  Total time in clinic (min): 45 minutes    Subjective: Patient reports his shoulder is feeling okay  Stated his MD wants him to do more therapy  Objective: See treatment diary below      Assessment: Tolerated treatment well  Patient demonstrated fatigue post treatment and would benefit from continued skilled PT  Pain in shoulder w/ new scap stab exercises  Reduced muscular tightness in UT and deltoid post MFR  Plan: Continue per plan of care  Precautions: none      Manual 6/3 6/9 6/11 6/14 6/16 6/21       MFR             Jt Mobs             vib/mass             PROM L shoulder  TK 10' JH 10' + MFR 10' + mfR to pec 10'       Neuro Re-Ed             Prone retract    Row 10x         Prone abd             Prone ext  10x  10x         Ly 3-way     Rows 15# 20x        scap stab w/ ball on wall      20x       scap punches      20x                    Ther Ex             ube  3'fwd/3'bkw 4/4 4/4 44 4       pulleys  3' FF 5' 5' 5' 5'       Doorway stretch  15" x4 15" 4x 30" 3x 30" 3x 30" 3x       TB - all planes   YTB 20x ea YTB 20x ea YTB 20x ea YTB 20x       grippers             Pt ed - HEP 5            Prom 10            Supine pec stretch half roll   15" 3x          T/s extension over roll    5" 10x 10" 10x 10" 10x 10" 10x       LT raise on wall w/ foam roll     5x fatigue, p!                      Ther Activity             lift             Supine ER/HA      YTB 20x       Gait Training                                       Modalities             MH             CP  10'

## 2021-06-23 ENCOUNTER — OFFICE VISIT (OUTPATIENT)
Dept: PHYSICAL THERAPY | Facility: CLINIC | Age: 51
End: 2021-06-23
Payer: OTHER MISCELLANEOUS

## 2021-06-23 DIAGNOSIS — G89.29 CHRONIC LEFT SHOULDER PAIN: Primary | ICD-10-CM

## 2021-06-23 DIAGNOSIS — M25.512 CHRONIC LEFT SHOULDER PAIN: Primary | ICD-10-CM

## 2021-06-23 PROCEDURE — 97110 THERAPEUTIC EXERCISES: CPT

## 2021-06-23 PROCEDURE — 97112 NEUROMUSCULAR REEDUCATION: CPT

## 2021-06-23 PROCEDURE — 97530 THERAPEUTIC ACTIVITIES: CPT

## 2021-06-23 PROCEDURE — 97140 MANUAL THERAPY 1/> REGIONS: CPT

## 2021-06-23 NOTE — PROGRESS NOTES
Daily Note     Today's date: 2021  Patient name: Larissa Dodd  : 1970  MRN: [id number]  Referring provider: No ref  provider found  Dx:   Encounter Diagnosis     ICD-10-CM    1  Chronic left shoulder pain  M25 512     G89 29        Start Time: 1415  Stop Time: 1500  Total time in clinic (min): 45 minutes    Subjective: Patient reports his pain is 5/10 today in the shoulder  Objective: See treatment diary below      Assessment: Tolerated treatment well  Patient demonstrated fatigue post treatment and would benefit from continued skilled PT  Patient did struggle w/ exercises today due to pain  Improve mobility post PROM  Added AAROM exercises to improve shoulder motion in flexion/ER  Plan: Continue per plan of care  Precautions: none      Manual 6/3 6/9 6/11 6/14 6/16 6/21 6/23      MFR             Jt Mobs             vib/mass             PROM L shoulder  TK 10' JH 10' + MFR 10' + mfR to pec 10' 10'      Neuro Re-Ed             Prone retract    Row 10x         Prone abd             Prone ext  10x  10x         [place] 3-way     Rows 15# 20x        scap stab w/ ball on wall      20x 20x      scap punches      20x p! S/l ER       20x      Ther Ex             ube  3'fwd/3'bkw 4/4 4/4 4/4 4/4 4/4      pulleys  3' FF 5' 5' 5' 5' 5'      Doorway stretch  15" x4 15" 4x 30" 3x 30" 3x 30" 3x       TB - all planes   YTB 20x ea YTB 20x ea YTB 20x ea YTB 20x       Cane flex/ER       10" 10x      Pt ed - HEP 5            Prom 10            Supine pec stretch half roll   15" 3x          T/s extension over roll    5" 10x 10" 10x 10" 10x 10" 10x 10" 10x      LT raise on wall w/ foam roll     5x fatigue, p!                      Ther Activity             lift             Supine ER/HA      YTB 20x standing YTB 20x      Gait Training                                       Modalities                          CP  10'

## 2021-06-29 ENCOUNTER — OFFICE VISIT (OUTPATIENT)
Dept: PHYSICAL THERAPY | Facility: CLINIC | Age: 51
End: 2021-06-29
Payer: OTHER MISCELLANEOUS

## 2021-06-29 DIAGNOSIS — M25.512 CHRONIC LEFT SHOULDER PAIN: Primary | ICD-10-CM

## 2021-06-29 DIAGNOSIS — G89.29 CHRONIC LEFT SHOULDER PAIN: Primary | ICD-10-CM

## 2021-06-29 PROCEDURE — 97140 MANUAL THERAPY 1/> REGIONS: CPT

## 2021-06-29 PROCEDURE — 97110 THERAPEUTIC EXERCISES: CPT

## 2021-06-29 PROCEDURE — 97112 NEUROMUSCULAR REEDUCATION: CPT

## 2021-06-29 NOTE — PROGRESS NOTES
Daily Note     Today's date: 2021  Patient name: Delaney Rodriguez  : 1970  MRN: [id number]  Referring provider: Dayna Farooq MD  Dx:   Encounter Diagnosis     ICD-10-CM    1  Chronic left shoulder pain  M25 512     G89 29        Start Time: 1100  Stop Time: 1145  Total time in clinic (min): 45 minutes    Subjective: Patient reports no new complaints  Shoulder is feeling better w/ certain movements  Objective: See treatment diary below      Assessment: Tolerated treatment well  Patient demonstrated fatigue post treatment and would benefit from continued skilled PT  Progressed strengthening exercises w/ minimal difficulty  Improved mobility OH noted  Plan: Continue per plan of care  Precautions: none      Manual 6/3 6/9 6/11 6/14 6/16 6/21 6/23 6/29     MFR             Jt Mobs             vib/mass             PROM L shoulder  TK 10' JH 10' + MFR 10' + mfR to pec 10' 10' 10'     Neuro Re-Ed             Prone retract    Row 10x         Body blade        20" 2x 3 way     Prone ext  10x  10x         [place] 3-way     Rows 15# 20x        scap stab w/ ball on wall      20x 20x 20x     scap punches      20x p! 20x     S/l ER       20x 20x     Ther Ex             ube  3'fwd/3'bkw 4/4 4/4 4/4 4/4 4/4 4/4     pulleys  3' FF 5' 5' 5' 5' 5' 5'     Doorway stretch  15" x4 15" 4x 30" 3x 30" 3x 30" 3x  L only 30" 3x     TB - all planes   YTB 20x ea YTB 20x ea YTB 20x ea YTB 20x  RTB 20x     Cane flex/ER       10" 10x 10" 10x     Pt ed - HEP 5            Prom 10            Supine pec stretch half roll   15" 3x          T/s extension over roll    5" 10x 10" 10x 10" 10x 10" 10x 10" 10x      LT raise on wall w/ foam roll     5x fatigue, p!                      Ther Activity             lift             Supine ER/HA      YTB 20x standing YTB 20x      Gait Training                                       Modalities             MH             CP  10'

## 2021-07-01 ENCOUNTER — OFFICE VISIT (OUTPATIENT)
Dept: PHYSICAL THERAPY | Facility: CLINIC | Age: 51
End: 2021-07-01
Payer: OTHER MISCELLANEOUS

## 2021-07-01 DIAGNOSIS — M25.512 CHRONIC LEFT SHOULDER PAIN: Primary | ICD-10-CM

## 2021-07-01 DIAGNOSIS — G89.29 CHRONIC LEFT SHOULDER PAIN: Primary | ICD-10-CM

## 2021-07-01 PROCEDURE — 97140 MANUAL THERAPY 1/> REGIONS: CPT

## 2021-07-01 PROCEDURE — 97110 THERAPEUTIC EXERCISES: CPT

## 2021-07-01 NOTE — PROGRESS NOTES
Daily Note     Today's date: 2021  Patient name: Arvind Wall  : 1970  MRN: [id number]  Referring provider: Jimmy Ochoa MD  Dx:   Encounter Diagnosis     ICD-10-CM    1  Chronic left shoulder pain  M25 512     G89 29        Start Time: 1107          Subjective: Pt reports feeling sore today  Objective: See treatment diary below      Assessment: Tolerated treatment well  Patient with decreased ROM in L shoulder  Pt has tight and painful end ranges  Pt given verbal cues to relax with PROM  Pt has discomfort with there ex  Pt with weakness in L UE/shoulder/scap  Pt would benefit from continued therapy for strengthening for improving functional mobility  Plan: Continue per plan of care  Precautions: none      Manual 6/3 6/9 6/11 6/14 6/16 6/21 6/23 6/29 7/1    MFR             Jt Mobs             vib/mass             PROM L shoulder  TK 10' JH 10' + MFR 10' + mfR to pec 10' 10' 10' 10'    Neuro Re-Ed             Prone retract    Row 10x         Body blade        20" 2x 3 way     Prone ext  10x  10x         [place] 3-way     Rows 15# 20x        scap stab w/ ball on wall      20x 20x 20x     scap punches      20x p! 20x     S/l ER       20x 20x     Ther Ex             ube  3'fwd/3'bkw 4/4 4/4 4/4 4/4 4/4 4/4 4'/4'    pulleys  3' FF 5' 5' 5' 5' 5' 5' 5'    Doorway stretch  15" x4 15" 4x 30" 3x 30" 3x 30" 3x  L only 30" 3x     TB - all planes   YTB 20x ea YTB 20x ea YTB 20x ea YTB 20x  RTB 20x     Cane flex/ER       10" 10x 10" 10x 10x10" ea    Pt ed - HEP 5            Prom 10            Supine pec stretch half roll   15" 3x          T/s extension over roll    5" 10x 10" 10x 10" 10x 10" 10x 10" 10x      LT raise on wall w/ foam roll     5x fatigue, p!                      Ther Activity             lift             Supine ER/HA      YTB 20x standing YTB 20x  YTB standing 20x    Gait Training                                       Modalities                          CP  10'

## 2021-07-06 ENCOUNTER — OFFICE VISIT (OUTPATIENT)
Dept: PHYSICAL THERAPY | Facility: CLINIC | Age: 51
End: 2021-07-06
Payer: OTHER MISCELLANEOUS

## 2021-07-06 DIAGNOSIS — M25.512 CHRONIC LEFT SHOULDER PAIN: Primary | ICD-10-CM

## 2021-07-06 DIAGNOSIS — G89.29 CHRONIC LEFT SHOULDER PAIN: Primary | ICD-10-CM

## 2021-07-06 PROCEDURE — 97112 NEUROMUSCULAR REEDUCATION: CPT

## 2021-07-06 PROCEDURE — 97110 THERAPEUTIC EXERCISES: CPT

## 2021-07-06 PROCEDURE — 97530 THERAPEUTIC ACTIVITIES: CPT

## 2021-07-06 NOTE — PROGRESS NOTES
Daily Note     Today's date: 2021  Patient name: Anup Shen  : 1970  MRN: [id number]  Referring provider: No ref  provider found  Dx:   Encounter Diagnosis     ICD-10-CM    1  Chronic left shoulder pain  M25 512     G89 29        Start Time: 0850  Stop Time: 09  Total time in clinic (min): 40 minutes    Subjective: Patient reports he was sore post last session  Objective: See treatment diary below      Assessment: Tolerated treatment well  Patient demonstrated fatigue post treatment and would benefit from continued skilled PT  Patient was challenged w/ supine serratus punch  Patient required cueing for proper technique t/o session  Plan: Continue per plan of care  Precautions: none      Manual 6/3 6/9 6/11 6/14 6/16 6/21 6/23 6/29 7/1 7/6   MFR             Jt Mobs             vib/mass             PROM L shoulder  TK 10' JH 10' + MFR 10' + mfR to pec 10' 10' 10' 10'    Neuro Re-Ed             Prone retract    Row 10x         Body blade        20" 2x 3 way     Prone ext  10x  10x         Ly 3-way     Rows 15# 20x        scap stab w/ ball on wall      20x 20x 20x     scap punches      20x p! 20x  30x   S/l ER       20x 20x  30x   Ther Ex             ube  3'fwd/3'bkw 4/4 4/4 4/4 4/4 4/4 4/4 4'/4' 4/4   pulleys  3' FF 5' 5' 5' 5' 5' 5' 5' 5'   Doorway stretch  15" x4 15" 4x 30" 3x 30" 3x 30" 3x  L only 30" 3x     TB - all planes   YTB 20x ea YTB 20x ea YTB 20x ea YTB 20x  RTB 20x  YTB 30x   Cane flex/ER       10" 10x 10" 10x 10x10" ea 10" 10x ea   Pt ed - HEP 5            Prom 10            Supine pec stretch half roll   15" 3x          T/s extension over roll    5" 10x 10" 10x 10" 10x 10" 10x 10" 10x      LT raise on wall w/ foam roll     5x fatigue, p!                      Ther Activity             lift             Supine ER/HA      YTB 20x standing YTB 20x  YTB standing 20x YTB stnading 30x   Gait Training                                       Modalities              CP  10'

## 2021-07-08 ENCOUNTER — OFFICE VISIT (OUTPATIENT)
Dept: PHYSICAL THERAPY | Facility: CLINIC | Age: 51
End: 2021-07-08
Payer: OTHER MISCELLANEOUS

## 2021-07-08 DIAGNOSIS — M25.512 CHRONIC LEFT SHOULDER PAIN: Primary | ICD-10-CM

## 2021-07-08 DIAGNOSIS — G89.29 CHRONIC LEFT SHOULDER PAIN: Primary | ICD-10-CM

## 2021-07-08 PROCEDURE — 97530 THERAPEUTIC ACTIVITIES: CPT

## 2021-07-08 PROCEDURE — 97112 NEUROMUSCULAR REEDUCATION: CPT

## 2021-07-08 PROCEDURE — 97110 THERAPEUTIC EXERCISES: CPT

## 2021-07-08 NOTE — PROGRESS NOTES
Daily Note     Today's date: 2021  Patient name: Hilario Murray  : 1970  MRN: [id number]  Referring provider: No ref  provider found  Dx:   Encounter Diagnosis     ICD-10-CM    1  Chronic left shoulder pain  M25 512     G89 29        Start Time: 845  Stop Time: 930  Total time in clinic (min): 45 minutes    Subjective: Patient reports his L shoulder is feeling better compared to last session  Objective: See treatment diary below      Assessment: Tolerated treatment well  Patient demonstrated fatigue post treatment and would benefit from continued skilled PT  Progressed scap stab exercises w/ some difficulty due to pain  Cueing for posture t/o session  Plan: Continue per plan of care        Precautions: none      Manual    MFR             Jt Mobs             vib/mass             PROM L shoulder      10' 10' 10' 10'    Neuro Re-Ed             Mod prone row 20x            Body blade        20" 2x 3 way     Prone ext Mod 2x10            [place] 3-way 20x            scap stab w/ ball on wall      20x 20x 20x     scap punches      20x p! 20x  30x   S/l ER       20x 20x  30x   Ther Ex             ube      4/4 4/4 4/4 4'/4' 4/4   pulleys 5'     5' 5' 5' 5' 5'   Doorway stretch      30" 3x  L only 30" 3x     TB - all planes 20x     YTB 20x  RTB 20x  YTB 30x   Cane flex/ER       10" 10x 10" 10x 10x10" ea 10" 10x ea   Pt ed - HEP             Prom             Supine pec stretch half roll             T/s extension over roll       10" 10x 10" 10x      LT raise on wall w/ foam roll                          Ther Activity             lift unilateral carry 10# 3 laps            Supine ER/HA      YTB 20x standing YTB 20x  YTB standing 20x YTB stnading 30x   Gait Training                                       Modalities             MH             CP

## 2021-07-13 ENCOUNTER — EVALUATION (OUTPATIENT)
Dept: PHYSICAL THERAPY | Facility: CLINIC | Age: 51
End: 2021-07-13
Payer: OTHER MISCELLANEOUS

## 2021-07-13 DIAGNOSIS — G89.29 CHRONIC LEFT SHOULDER PAIN: Primary | ICD-10-CM

## 2021-07-13 DIAGNOSIS — M25.512 CHRONIC LEFT SHOULDER PAIN: Primary | ICD-10-CM

## 2021-07-13 PROCEDURE — 97112 NEUROMUSCULAR REEDUCATION: CPT | Performed by: PHYSICAL THERAPIST

## 2021-07-13 PROCEDURE — 97110 THERAPEUTIC EXERCISES: CPT | Performed by: PHYSICAL THERAPIST

## 2021-07-13 NOTE — LETTER
2021    Faina Francois MD   [facility] [address]    Patient: Marie Rizvi   YOB: 1970   Date of Visit: 2021     Encounter Diagnosis     ICD-10-CM    1  Chronic left shoulder pain  M25 512     G89 29        Dear Dr Stephany Kaiser:    Thank you for your recent referral of Marie Rizvi  Please review the attached evaluation summary from [facility]'s recent visit  Please verify that you agree with the plan of care by signing the attached order  If you have any questions or concerns, please do not hesitate to call  I sincerely appreciate the opportunity to share in the care of one of your patients and hope to have another opportunity to work with you in the near future  Sincerely,    Zaid Paez, PT      Referring Provider:      I certify that I have read the below Plan of Care and certify the need for these services furnished under this plan of treatment while under my care  Faina Francois MD   [facility] [address]  Via Fax: [phone]          Re-certification     Today's date: 2021  Patient name: Marie Rizvi  : 1970  MRN: [id number]  Referring provider: Sher Senior MD  Dx:   Encounter Diagnosis     ICD-10-CM    1  Chronic left shoulder pain  M25 512     G89 29        Start Time: 1545  Stop Time: 1630  Total time in clinic (min): 45 minutes    Subjective: Patient reports L shoulder pain 9/10 with abduction      Objective: L LE Arom shoulder flexion 70deg; abd 70 deg; er 50 deg; ir sacrum; extension 50 deg; Strength shoulder flexion 3+/5; abduction 3+/5; er/ir 4/5; extension 4/5      Assessment:  Increased Arom and Strength; able to lift below shoulder height with decreased difficulty; remaining difficulty lifting above shoulder height; remaining difficulty sleeping      Plan: Continue per plan of care   2x/week - 4 weeks; STGs 50% Met; LTGs 25% Met     Precautions: none      Manual 7/8 7/13 6/21 6/23 6/29 7/1 7/6   MFR             Jt Mobs             vib/mass             PROM L shoulder      10' 10' 10' 10'    Neuro Re-Ed             Mod prone row 20x            Body blade        20" 2x 3 way     Prone ext Mod 2x10            [place] 3-way 20x 20x           scap stab w/ ball on wall      20x 20x 20x     scap punches      20x p! 20x  30x   S/l ER       20x 20x  30x   Ther Ex             ube 4/4 5/5 4/4 4/4 4/4 4'/4' 4/4   pulleys 5' 5'    5' 5' 5' 5' 5'   Doorway stretch      30" 3x  L only 30" 3x     TB - all planes 20x 20x ea    YTB 20x  RTB 20x  YTB 30x   Cane flex/ER       10" 10x 10" 10x 10x10" ea 10" 10x ea   Pt ed - HEP             Prom             Supine pec stretch half roll             T/s extension over roll       10" 10x 10" 10x      LT raise on wall w/ foam roll                          Ther Activity             lift unilateral carry 10# 3 laps            Supine ER/HA      YTB 20x standing YTB 20x  YTB standing 20x YTB stnading 30x   Gait Training                                       Modalities             MH             CP

## 2021-07-13 NOTE — PROGRESS NOTES
Re-certification     Today's date: 2021  Patient name: Yayo Rodriguez  : 1970  MRN: [id number]  Referring provider: Edinson Gastelum MD  Dx:   Encounter Diagnosis     ICD-10-CM    1  Chronic left shoulder pain  M25 512     G89 29        Start Time: 1545  Stop Time: 1630  Total time in clinic (min): 45 minutes    Subjective: Patient reports L shoulder pain 9/10 with abduction      Objective: L LE Arom shoulder flexion 70deg; abd 70 deg; er 50 deg; ir sacrum; extension 50 deg; Strength shoulder flexion 3+/5; abduction 3+/5; er/ir 4/5; extension 4/5      Assessment:  Increased Arom and Strength; able to lift below shoulder height with decreased difficulty; remaining difficulty lifting above shoulder height; remaining difficulty sleeping      Plan: Continue per plan of care   2x/week - 4 weeks; STGs 50% Met; LTGs 25% Met     Precautions: none      Manual    MFR             Jt Mobs             vib/mass             PROM L shoulder      10' 10' 10' 10'    Neuro Re-Ed             Mod prone row 20x            Body blade        20" 2x 3 way     Prone ext Mod 2x10            [place] 3-way 20x 20x           scap stab w/ ball on wall      20x 20x 20x     scap punches      20x p! 20x  30x   S/l ER       20x 20x  30x   Ther Ex             ube  5/5    4/ 4/4 4/4 4'/4' 4/4   pulleys 5' 5'    5' 5' 5' 5' 5'   Doorway stretch      30" 3x  L only 30" 3x     TB - all planes 20x 20x ea    YTB 20x  RTB 20x  YTB 30x   Cane flex/ER       10" 10x 10" 10x 10x10" ea 10" 10x ea   Pt ed - HEP             Prom             Supine pec stretch half roll             T/s extension over roll       10" 10x 10" 10x      LT raise on wall w/ foam roll                          Ther Activity             lift unilateral carry 10# 3 laps            Supine ER/HA      YTB 20x standing YTB 20x  YTB standing 20x YTB stnading 30x   Gait Training                                       Modalities              CP

## 2021-07-15 ENCOUNTER — APPOINTMENT (OUTPATIENT)
Dept: PHYSICAL THERAPY | Facility: CLINIC | Age: 51
End: 2021-07-15
Payer: OTHER MISCELLANEOUS

## 2021-09-30 ENCOUNTER — EVALUATION (OUTPATIENT)
Dept: PHYSICAL THERAPY | Facility: CLINIC | Age: 51
End: 2021-09-30
Payer: OTHER MISCELLANEOUS

## 2021-09-30 DIAGNOSIS — M75.41 IMPINGEMENT SYNDROME OF RIGHT SHOULDER: ICD-10-CM

## 2021-09-30 DIAGNOSIS — M25.511 CHRONIC RIGHT SHOULDER PAIN: Primary | ICD-10-CM

## 2021-09-30 DIAGNOSIS — G89.29 CHRONIC RIGHT SHOULDER PAIN: Primary | ICD-10-CM

## 2021-09-30 PROCEDURE — 97110 THERAPEUTIC EXERCISES: CPT | Performed by: PHYSICAL THERAPIST

## 2021-09-30 PROCEDURE — 97161 PT EVAL LOW COMPLEX 20 MIN: CPT | Performed by: PHYSICAL THERAPIST

## 2021-09-30 NOTE — LETTER
2021    Cecilia Nunez MD   [facility] [address]    Patient: Luz Larsen   YOB: 1970   Date of Visit: 2021     Encounter Diagnosis     ICD-10-CM    1  Chronic right shoulder pain  M25 511     G89 29    2  Impingement syndrome of right shoulder  M75 41        Dear Dr Hiwot Vail:    Thank you for your recent referral of Luz Larsen  Please [facility]  Please verify that you agree with the plan of care by signing the attached order  If you have any questions or concerns, please do not hesitate to call  I sincerely appreciate the opportunity to share in the care of one of your patients and hope to have another opportunity to work with you in the near future  Sincerely,    Maria A Dos Santos PT      Referring Provider:      I certify that I have read the below Plan of Care and certify the need for these services furnished under this plan of treatment while under my care  Cecilia Nunez MD  [address] [place] [address]  Via Fax: [phone]          PT Evaluation     Today's date: 2021  Patient name: Luz Larsen  : 1970  MRN: [id number]  Referring provider: Alvina Tijerina MD  Dx:   Encounter Diagnosis     ICD-10-CM    1  Chronic right shoulder pain  M25 511     G89 29    2   Impingement syndrome of right shoulder  M75 41        Start Time: 1100  Stop Time: 1145  Total time in clinic (min): 45 minutes    Assessment  Assessment details: Patient presents with chronic R shoulder pain secondary to lifting at work , demonstrates FHRS, (+) BL UT myofascial tightness, decreased Rom and Strength, reports difficulty reaching overhead, lifting; patient would benefit from therapeutic exercise, manual therapy, patient education regarding posture and joint protection, therapeutic activities, HEP  Impairments: abnormal or restricted ROM, impaired physical strength, pain with function and poor posture   Understanding of Dx/Px/POC: good   Prognosis: good    Goals  STGs  1  Decrease pain 50% in 2 weeks  2  Increase Rom 50% in 2 weeks  3  Increase Strength half grade in 2 weeks  4  Compliant with HEP in 2 weeks  LTGs  1  Decrease pain to mild to none in 4 weeks  2  Increase Rom WNL in 4 weeks  3  Increase Strength WNL in 4 weeks  4   Able to reach, lift with mild to no difficulty in 4 weeks    Plan  Patient would benefit from: skilled physical therapy  Planned modality interventions: hydrotherapy  Planned therapy interventions: manual therapy, joint mobilization, neuromuscular re-education, patient education, postural training, therapeutic activities, therapeutic exercise, home exercise program and functional ROM exercises  Frequency: 2x week  Duration in weeks: 4        Subjective Evaluation    History of Present Illness  Mechanism of injury: Chronic, secondary to lifting at work 2018          Recurrent probem    Quality of life: good    Pain  Current pain ratin  At best pain ratin  At worst pain ratin  Quality: throbbing  Relieving factors: rest  Aggravating factors: overhead activity and lifting    Social Support  Lives with: spouse and young children    Employment status: working  Hand dominance: right      Diagnostic Tests  No diagnostic tests performed  Treatments  Previous treatment: physical therapy  Current treatment: injection treatment  Patient Goals  Patient goals for therapy: increased strength, decreased pain and increased motion  Patient goal: reach overhead, lift        Objective     Postural Observations    Additional Postural Observation Details  FHRS, (+) BL UT myofascial tightness    Active Range of Motion     Right Shoulder   Flexion: WFL and with pain  Extension: WFL  Abduction: WFL and with pain  External rotation 0°: 45 degrees   Internal rotation BTB: T12     Strength/Myotome Testing     Right Shoulder     Planes of Motion   Flexion: 4-   Extension: 5   Abduction: 4   External rotation at 0°: 4+   Internal rotation at 0°: 4+              Precautions: none      Manual 9/30            MFR 5            vib mass             jt mobs                          Neuro Re-Ed             [employer] 3-sayra             Prone retract/abd/ext             Nerve glides                                                                 Ther Ex             UBE             TB - all planes             Grippers             Prom 10                                                                Ther Activity             reach             lift             Gait Training                                       Modalities             MH

## 2021-09-30 NOTE — PROGRESS NOTES
PT Evaluation     Today's date: 2021  Patient name: Marie Rizvi  : 1970  MRN: [id number]  Referring provider: Sher Senior MD  Dx:   Encounter Diagnosis     ICD-10-CM    1  Chronic right shoulder pain  M25 511     G89 29    2  Impingement syndrome of right shoulder  M75 41        Start Time: 1100  Stop Time: 1145  Total time in clinic (min): 45 minutes    Assessment  Assessment details: Patient presents with chronic R shoulder pain secondary to lifting at work 2018, demonstrates FHRS, (+) BL UT myofascial tightness, decreased Rom and Strength, reports difficulty reaching overhead, lifting; patient would benefit from therapeutic exercise, manual therapy, patient education regarding posture and joint protection, therapeutic activities, HEP  Impairments: abnormal or restricted ROM, impaired physical strength, pain with function and poor posture   Understanding of Dx/Px/POC: good   Prognosis: good    Goals  STGs  1  Decrease pain 50% in 2 weeks  2  Increase Rom 50% in 2 weeks  3  Increase Strength half grade in 2 weeks  4  Compliant with HEP in 2 weeks  LTGs  1  Decrease pain to mild to none in 4 weeks  2  Increase Rom WNL in 4 weeks  3  Increase Strength WNL in 4 weeks  4   Able to reach, lift with mild to no difficulty in 4 weeks    Plan  Patient would benefit from: skilled physical therapy  Planned modality interventions: hydrotherapy  Planned therapy interventions: manual therapy, joint mobilization, neuromuscular re-education, patient education, postural training, therapeutic activities, therapeutic exercise, home exercise program and functional ROM exercises  Frequency: 2x week  Duration in weeks: 4        Subjective Evaluation    History of Present Illness  Mechanism of injury: Chronic, secondary to lifting at work 2018          Recurrent probem    Quality of life: good    Pain  Current pain ratin  At best pain ratin  At worst pain ratin  Quality: throbbing  Relieving factors: rest  Aggravating factors: overhead activity and lifting    Social Support  Lives with: spouse and young children    Employment status: working  Hand dominance: right      Diagnostic Tests  No diagnostic tests performed  Treatments  Previous treatment: physical therapy  Current treatment: injection treatment  Patient Goals  Patient goals for therapy: increased strength, decreased pain and increased motion  Patient goal: reach overhead, lift        Objective     Postural Observations    Additional Postural Observation Details  FHRS, (+) BL UT myofascial tightness    Active Range of Motion     Right Shoulder   Flexion: WFL and with pain  Extension: WFL  Abduction: WFL and with pain  External rotation 0°: 45 degrees   Internal rotation BTB: T12     Strength/Myotome Testing     Right Shoulder     Planes of Motion   Flexion: 4-   Extension: 5   Abduction: 4   External rotation at 0°: 4+   Internal rotation at 0°: 4+              Precautions: none      Manual 9/30            MFR 5            vib mass             jt mobs                          Neuro Re-Ed             [employer] 3-sayra             Prone retract/abd/ext             Nerve glides                                                                 Ther Ex             UBE             TB - all planes             Grippers             Prom 10                                                                Ther Activity             reach             lift             Gait Training                                       Modalities             MH

## 2021-10-07 ENCOUNTER — EVALUATION (OUTPATIENT)
Dept: PHYSICAL THERAPY | Facility: CLINIC | Age: 51
End: 2021-10-07
Payer: OTHER MISCELLANEOUS

## 2021-10-07 ENCOUNTER — OFFICE VISIT (OUTPATIENT)
Dept: PHYSICAL THERAPY | Facility: CLINIC | Age: 51
End: 2021-10-07
Payer: OTHER MISCELLANEOUS

## 2021-10-07 DIAGNOSIS — M54.6 CHRONIC MIDLINE THORACIC BACK PAIN: ICD-10-CM

## 2021-10-07 DIAGNOSIS — M25.552 LEFT HIP PAIN: ICD-10-CM

## 2021-10-07 DIAGNOSIS — G89.29 CHRONIC RIGHT SHOULDER PAIN: Primary | ICD-10-CM

## 2021-10-07 DIAGNOSIS — G89.29 CHRONIC MIDLINE THORACIC BACK PAIN: ICD-10-CM

## 2021-10-07 DIAGNOSIS — M54.50 CHRONIC LEFT-SIDED LOW BACK PAIN, UNSPECIFIED WHETHER SCIATICA PRESENT: ICD-10-CM

## 2021-10-07 DIAGNOSIS — M25.512 CHRONIC LEFT SHOULDER PAIN: Primary | ICD-10-CM

## 2021-10-07 DIAGNOSIS — G89.29 CHRONIC LEFT SHOULDER PAIN: Primary | ICD-10-CM

## 2021-10-07 DIAGNOSIS — G89.29 CHRONIC LEFT-SIDED LOW BACK PAIN, UNSPECIFIED WHETHER SCIATICA PRESENT: ICD-10-CM

## 2021-10-07 DIAGNOSIS — M25.511 CHRONIC RIGHT SHOULDER PAIN: Primary | ICD-10-CM

## 2021-10-07 PROCEDURE — 97162 PT EVAL MOD COMPLEX 30 MIN: CPT | Performed by: PHYSICAL THERAPIST

## 2021-10-07 PROCEDURE — 97110 THERAPEUTIC EXERCISES: CPT

## 2021-10-07 PROCEDURE — 97112 NEUROMUSCULAR REEDUCATION: CPT

## 2021-10-07 PROCEDURE — 97140 MANUAL THERAPY 1/> REGIONS: CPT

## 2021-10-07 PROCEDURE — 97110 THERAPEUTIC EXERCISES: CPT | Performed by: PHYSICAL THERAPIST

## 2021-10-12 ENCOUNTER — OFFICE VISIT (OUTPATIENT)
Dept: PHYSICAL THERAPY | Facility: CLINIC | Age: 51
End: 2021-10-12
Payer: OTHER MISCELLANEOUS

## 2021-10-12 DIAGNOSIS — M25.512 CHRONIC LEFT SHOULDER PAIN: ICD-10-CM

## 2021-10-12 DIAGNOSIS — G89.29 CHRONIC RIGHT SHOULDER PAIN: Primary | ICD-10-CM

## 2021-10-12 DIAGNOSIS — G89.29 CHRONIC LEFT-SIDED LOW BACK PAIN, UNSPECIFIED WHETHER SCIATICA PRESENT: ICD-10-CM

## 2021-10-12 DIAGNOSIS — G89.29 CHRONIC MIDLINE THORACIC BACK PAIN: Primary | ICD-10-CM

## 2021-10-12 DIAGNOSIS — M25.552 LEFT HIP PAIN: ICD-10-CM

## 2021-10-12 DIAGNOSIS — G89.29 CHRONIC LEFT SHOULDER PAIN: ICD-10-CM

## 2021-10-12 DIAGNOSIS — M25.511 CHRONIC RIGHT SHOULDER PAIN: Primary | ICD-10-CM

## 2021-10-12 DIAGNOSIS — M54.50 CHRONIC LEFT-SIDED LOW BACK PAIN, UNSPECIFIED WHETHER SCIATICA PRESENT: ICD-10-CM

## 2021-10-12 DIAGNOSIS — M54.6 CHRONIC MIDLINE THORACIC BACK PAIN: Primary | ICD-10-CM

## 2021-10-12 PROCEDURE — 97110 THERAPEUTIC EXERCISES: CPT

## 2021-10-12 PROCEDURE — 97112 NEUROMUSCULAR REEDUCATION: CPT

## 2021-10-12 PROCEDURE — 97140 MANUAL THERAPY 1/> REGIONS: CPT

## 2021-10-14 ENCOUNTER — OFFICE VISIT (OUTPATIENT)
Dept: PHYSICAL THERAPY | Facility: CLINIC | Age: 51
End: 2021-10-14
Payer: OTHER MISCELLANEOUS

## 2021-10-14 DIAGNOSIS — M25.511 CHRONIC RIGHT SHOULDER PAIN: ICD-10-CM

## 2021-10-14 DIAGNOSIS — M54.6 CHRONIC MIDLINE THORACIC BACK PAIN: Primary | ICD-10-CM

## 2021-10-14 DIAGNOSIS — M25.512 CHRONIC LEFT SHOULDER PAIN: ICD-10-CM

## 2021-10-14 DIAGNOSIS — M25.552 LEFT HIP PAIN: ICD-10-CM

## 2021-10-14 DIAGNOSIS — G89.29 CHRONIC MIDLINE THORACIC BACK PAIN: Primary | ICD-10-CM

## 2021-10-14 DIAGNOSIS — M54.50 CHRONIC LEFT-SIDED LOW BACK PAIN, UNSPECIFIED WHETHER SCIATICA PRESENT: ICD-10-CM

## 2021-10-14 DIAGNOSIS — G89.29 CHRONIC RIGHT SHOULDER PAIN: ICD-10-CM

## 2021-10-14 DIAGNOSIS — M75.41 IMPINGEMENT SYNDROME OF RIGHT SHOULDER: ICD-10-CM

## 2021-10-14 DIAGNOSIS — G89.29 CHRONIC LEFT SHOULDER PAIN: ICD-10-CM

## 2021-10-14 DIAGNOSIS — G89.29 CHRONIC RIGHT SHOULDER PAIN: Primary | ICD-10-CM

## 2021-10-14 DIAGNOSIS — M25.511 CHRONIC RIGHT SHOULDER PAIN: Primary | ICD-10-CM

## 2021-10-14 DIAGNOSIS — G89.29 CHRONIC LEFT-SIDED LOW BACK PAIN, UNSPECIFIED WHETHER SCIATICA PRESENT: ICD-10-CM

## 2021-10-14 PROCEDURE — 97140 MANUAL THERAPY 1/> REGIONS: CPT

## 2021-10-14 PROCEDURE — 97110 THERAPEUTIC EXERCISES: CPT

## 2021-10-14 PROCEDURE — 97112 NEUROMUSCULAR REEDUCATION: CPT

## 2021-10-19 ENCOUNTER — APPOINTMENT (OUTPATIENT)
Dept: PHYSICAL THERAPY | Facility: CLINIC | Age: 51
End: 2021-10-19
Payer: OTHER MISCELLANEOUS

## 2021-10-21 ENCOUNTER — OFFICE VISIT (OUTPATIENT)
Dept: PHYSICAL THERAPY | Facility: CLINIC | Age: 51
End: 2021-10-21
Payer: OTHER MISCELLANEOUS

## 2021-10-21 DIAGNOSIS — G89.29 CHRONIC LEFT-SIDED LOW BACK PAIN, UNSPECIFIED WHETHER SCIATICA PRESENT: ICD-10-CM

## 2021-10-21 DIAGNOSIS — M25.552 LEFT HIP PAIN: Primary | ICD-10-CM

## 2021-10-21 DIAGNOSIS — G89.29 CHRONIC LEFT SHOULDER PAIN: ICD-10-CM

## 2021-10-21 DIAGNOSIS — M25.512 CHRONIC LEFT SHOULDER PAIN: ICD-10-CM

## 2021-10-21 DIAGNOSIS — G89.29 CHRONIC RIGHT SHOULDER PAIN: Primary | ICD-10-CM

## 2021-10-21 DIAGNOSIS — M54.6 CHRONIC MIDLINE THORACIC BACK PAIN: ICD-10-CM

## 2021-10-21 DIAGNOSIS — M25.511 CHRONIC RIGHT SHOULDER PAIN: Primary | ICD-10-CM

## 2021-10-21 DIAGNOSIS — G89.29 CHRONIC MIDLINE THORACIC BACK PAIN: ICD-10-CM

## 2021-10-21 DIAGNOSIS — M75.41 IMPINGEMENT SYNDROME OF RIGHT SHOULDER: ICD-10-CM

## 2021-10-21 DIAGNOSIS — M54.50 CHRONIC LEFT-SIDED LOW BACK PAIN, UNSPECIFIED WHETHER SCIATICA PRESENT: ICD-10-CM

## 2021-10-21 PROCEDURE — 97140 MANUAL THERAPY 1/> REGIONS: CPT

## 2021-10-21 PROCEDURE — 97112 NEUROMUSCULAR REEDUCATION: CPT

## 2021-10-21 PROCEDURE — 97110 THERAPEUTIC EXERCISES: CPT

## 2021-10-21 PROCEDURE — 97530 THERAPEUTIC ACTIVITIES: CPT

## 2021-10-26 ENCOUNTER — OFFICE VISIT (OUTPATIENT)
Dept: PHYSICAL THERAPY | Facility: CLINIC | Age: 51
End: 2021-10-26
Payer: OTHER MISCELLANEOUS

## 2021-10-26 DIAGNOSIS — G89.29 CHRONIC RIGHT SHOULDER PAIN: ICD-10-CM

## 2021-10-26 DIAGNOSIS — M25.511 CHRONIC RIGHT SHOULDER PAIN: ICD-10-CM

## 2021-10-26 DIAGNOSIS — M54.50 CHRONIC LEFT-SIDED LOW BACK PAIN, UNSPECIFIED WHETHER SCIATICA PRESENT: ICD-10-CM

## 2021-10-26 DIAGNOSIS — M75.41 IMPINGEMENT SYNDROME OF RIGHT SHOULDER: Primary | ICD-10-CM

## 2021-10-26 DIAGNOSIS — M25.552 LEFT HIP PAIN: ICD-10-CM

## 2021-10-26 DIAGNOSIS — G89.29 CHRONIC LEFT-SIDED LOW BACK PAIN, UNSPECIFIED WHETHER SCIATICA PRESENT: ICD-10-CM

## 2021-10-26 PROCEDURE — 97140 MANUAL THERAPY 1/> REGIONS: CPT

## 2021-10-26 PROCEDURE — 97112 NEUROMUSCULAR REEDUCATION: CPT

## 2021-10-26 PROCEDURE — 97110 THERAPEUTIC EXERCISES: CPT

## 2021-10-26 PROCEDURE — 97530 THERAPEUTIC ACTIVITIES: CPT

## 2021-10-28 ENCOUNTER — EVALUATION (OUTPATIENT)
Dept: PHYSICAL THERAPY | Facility: CLINIC | Age: 51
End: 2021-10-28
Payer: OTHER MISCELLANEOUS

## 2021-10-28 ENCOUNTER — OFFICE VISIT (OUTPATIENT)
Dept: PHYSICAL THERAPY | Facility: CLINIC | Age: 51
End: 2021-10-28
Payer: OTHER MISCELLANEOUS

## 2021-10-28 DIAGNOSIS — M54.6 CHRONIC MIDLINE THORACIC BACK PAIN: Primary | ICD-10-CM

## 2021-10-28 DIAGNOSIS — M75.41 IMPINGEMENT SYNDROME OF RIGHT SHOULDER: Primary | ICD-10-CM

## 2021-10-28 DIAGNOSIS — M54.50 CHRONIC LEFT-SIDED LOW BACK PAIN, UNSPECIFIED WHETHER SCIATICA PRESENT: ICD-10-CM

## 2021-10-28 DIAGNOSIS — M25.512 CHRONIC LEFT SHOULDER PAIN: ICD-10-CM

## 2021-10-28 DIAGNOSIS — G89.29 CHRONIC RIGHT SHOULDER PAIN: ICD-10-CM

## 2021-10-28 DIAGNOSIS — G89.29 CHRONIC MIDLINE THORACIC BACK PAIN: Primary | ICD-10-CM

## 2021-10-28 DIAGNOSIS — M25.552 LEFT HIP PAIN: ICD-10-CM

## 2021-10-28 DIAGNOSIS — G89.29 CHRONIC LEFT SHOULDER PAIN: ICD-10-CM

## 2021-10-28 DIAGNOSIS — M25.511 CHRONIC RIGHT SHOULDER PAIN: ICD-10-CM

## 2021-10-28 DIAGNOSIS — G89.29 CHRONIC LEFT-SIDED LOW BACK PAIN, UNSPECIFIED WHETHER SCIATICA PRESENT: ICD-10-CM

## 2021-10-28 PROCEDURE — 97112 NEUROMUSCULAR REEDUCATION: CPT

## 2021-10-28 PROCEDURE — 97110 THERAPEUTIC EXERCISES: CPT

## 2021-10-28 PROCEDURE — 97140 MANUAL THERAPY 1/> REGIONS: CPT

## 2021-10-28 PROCEDURE — 97530 THERAPEUTIC ACTIVITIES: CPT

## 2021-11-01 ENCOUNTER — OFFICE VISIT (OUTPATIENT)
Dept: PHYSICAL THERAPY | Facility: CLINIC | Age: 51
End: 2021-11-01
Payer: OTHER MISCELLANEOUS

## 2021-11-01 DIAGNOSIS — G89.29 CHRONIC MIDLINE THORACIC BACK PAIN: ICD-10-CM

## 2021-11-01 DIAGNOSIS — G89.29 CHRONIC LEFT-SIDED LOW BACK PAIN, UNSPECIFIED WHETHER SCIATICA PRESENT: ICD-10-CM

## 2021-11-01 DIAGNOSIS — M25.511 CHRONIC RIGHT SHOULDER PAIN: ICD-10-CM

## 2021-11-01 DIAGNOSIS — M54.6 CHRONIC MIDLINE THORACIC BACK PAIN: ICD-10-CM

## 2021-11-01 DIAGNOSIS — M54.50 CHRONIC LEFT-SIDED LOW BACK PAIN, UNSPECIFIED WHETHER SCIATICA PRESENT: ICD-10-CM

## 2021-11-01 DIAGNOSIS — M25.512 CHRONIC LEFT SHOULDER PAIN: ICD-10-CM

## 2021-11-01 DIAGNOSIS — G89.29 CHRONIC RIGHT SHOULDER PAIN: ICD-10-CM

## 2021-11-01 DIAGNOSIS — M25.552 LEFT HIP PAIN: Primary | ICD-10-CM

## 2021-11-01 DIAGNOSIS — M75.41 IMPINGEMENT SYNDROME OF RIGHT SHOULDER: Primary | ICD-10-CM

## 2021-11-01 DIAGNOSIS — G89.29 CHRONIC LEFT SHOULDER PAIN: ICD-10-CM

## 2021-11-01 PROCEDURE — 97110 THERAPEUTIC EXERCISES: CPT

## 2021-11-01 PROCEDURE — 97112 NEUROMUSCULAR REEDUCATION: CPT

## 2021-11-01 PROCEDURE — 97140 MANUAL THERAPY 1/> REGIONS: CPT

## 2021-11-01 PROCEDURE — 97530 THERAPEUTIC ACTIVITIES: CPT

## 2021-11-04 ENCOUNTER — EVALUATION (OUTPATIENT)
Dept: PHYSICAL THERAPY | Facility: CLINIC | Age: 51
End: 2021-11-04
Payer: OTHER MISCELLANEOUS

## 2021-11-04 ENCOUNTER — OFFICE VISIT (OUTPATIENT)
Dept: PHYSICAL THERAPY | Facility: CLINIC | Age: 51
End: 2021-11-04
Payer: OTHER MISCELLANEOUS

## 2021-11-04 DIAGNOSIS — M25.552 LEFT HIP PAIN: Primary | ICD-10-CM

## 2021-11-04 DIAGNOSIS — M25.511 CHRONIC RIGHT SHOULDER PAIN: ICD-10-CM

## 2021-11-04 DIAGNOSIS — G89.29 CHRONIC MIDLINE THORACIC BACK PAIN: ICD-10-CM

## 2021-11-04 DIAGNOSIS — M54.6 CHRONIC MIDLINE THORACIC BACK PAIN: ICD-10-CM

## 2021-11-04 DIAGNOSIS — G89.29 CHRONIC RIGHT SHOULDER PAIN: ICD-10-CM

## 2021-11-04 DIAGNOSIS — G89.29 CHRONIC LEFT SHOULDER PAIN: ICD-10-CM

## 2021-11-04 DIAGNOSIS — M25.512 CHRONIC LEFT SHOULDER PAIN: ICD-10-CM

## 2021-11-04 DIAGNOSIS — M54.50 CHRONIC LEFT-SIDED LOW BACK PAIN, UNSPECIFIED WHETHER SCIATICA PRESENT: ICD-10-CM

## 2021-11-04 DIAGNOSIS — G89.29 CHRONIC LEFT-SIDED LOW BACK PAIN, UNSPECIFIED WHETHER SCIATICA PRESENT: ICD-10-CM

## 2021-11-04 DIAGNOSIS — M75.41 IMPINGEMENT SYNDROME OF RIGHT SHOULDER: Primary | ICD-10-CM

## 2021-11-04 PROCEDURE — 97110 THERAPEUTIC EXERCISES: CPT | Performed by: PHYSICAL THERAPIST

## 2021-11-04 PROCEDURE — 97112 NEUROMUSCULAR REEDUCATION: CPT | Performed by: PHYSICAL THERAPIST

## 2021-11-04 PROCEDURE — 97530 THERAPEUTIC ACTIVITIES: CPT

## 2021-11-04 PROCEDURE — 97110 THERAPEUTIC EXERCISES: CPT

## 2021-11-04 PROCEDURE — 97112 NEUROMUSCULAR REEDUCATION: CPT

## 2021-11-09 ENCOUNTER — OFFICE VISIT (OUTPATIENT)
Dept: PHYSICAL THERAPY | Facility: CLINIC | Age: 51
End: 2021-11-09
Payer: OTHER MISCELLANEOUS

## 2021-11-09 DIAGNOSIS — G89.29 CHRONIC LEFT SHOULDER PAIN: ICD-10-CM

## 2021-11-09 DIAGNOSIS — M25.511 CHRONIC RIGHT SHOULDER PAIN: ICD-10-CM

## 2021-11-09 DIAGNOSIS — M25.512 CHRONIC LEFT SHOULDER PAIN: ICD-10-CM

## 2021-11-09 DIAGNOSIS — M25.552 LEFT HIP PAIN: Primary | ICD-10-CM

## 2021-11-09 DIAGNOSIS — M54.50 CHRONIC LEFT-SIDED LOW BACK PAIN, UNSPECIFIED WHETHER SCIATICA PRESENT: ICD-10-CM

## 2021-11-09 DIAGNOSIS — M75.41 IMPINGEMENT SYNDROME OF RIGHT SHOULDER: Primary | ICD-10-CM

## 2021-11-09 DIAGNOSIS — G89.29 CHRONIC MIDLINE THORACIC BACK PAIN: ICD-10-CM

## 2021-11-09 DIAGNOSIS — M54.6 CHRONIC MIDLINE THORACIC BACK PAIN: ICD-10-CM

## 2021-11-09 DIAGNOSIS — G89.29 CHRONIC RIGHT SHOULDER PAIN: ICD-10-CM

## 2021-11-09 DIAGNOSIS — G89.29 CHRONIC LEFT-SIDED LOW BACK PAIN, UNSPECIFIED WHETHER SCIATICA PRESENT: ICD-10-CM

## 2021-11-09 PROCEDURE — 97110 THERAPEUTIC EXERCISES: CPT

## 2021-11-09 PROCEDURE — 97140 MANUAL THERAPY 1/> REGIONS: CPT

## 2021-11-09 PROCEDURE — 97112 NEUROMUSCULAR REEDUCATION: CPT

## 2021-11-11 ENCOUNTER — OFFICE VISIT (OUTPATIENT)
Dept: PHYSICAL THERAPY | Facility: CLINIC | Age: 51
End: 2021-11-11
Payer: OTHER MISCELLANEOUS

## 2021-11-11 DIAGNOSIS — M54.50 CHRONIC LEFT-SIDED LOW BACK PAIN, UNSPECIFIED WHETHER SCIATICA PRESENT: ICD-10-CM

## 2021-11-11 DIAGNOSIS — M25.512 CHRONIC LEFT SHOULDER PAIN: Primary | ICD-10-CM

## 2021-11-11 DIAGNOSIS — G89.29 CHRONIC LEFT-SIDED LOW BACK PAIN, UNSPECIFIED WHETHER SCIATICA PRESENT: ICD-10-CM

## 2021-11-11 DIAGNOSIS — G89.29 CHRONIC MIDLINE THORACIC BACK PAIN: ICD-10-CM

## 2021-11-11 DIAGNOSIS — M25.552 LEFT HIP PAIN: ICD-10-CM

## 2021-11-11 DIAGNOSIS — M25.511 CHRONIC RIGHT SHOULDER PAIN: ICD-10-CM

## 2021-11-11 DIAGNOSIS — M54.6 CHRONIC MIDLINE THORACIC BACK PAIN: ICD-10-CM

## 2021-11-11 DIAGNOSIS — M75.41 IMPINGEMENT SYNDROME OF RIGHT SHOULDER: Primary | ICD-10-CM

## 2021-11-11 DIAGNOSIS — G89.29 CHRONIC RIGHT SHOULDER PAIN: ICD-10-CM

## 2021-11-11 DIAGNOSIS — G89.29 CHRONIC LEFT SHOULDER PAIN: Primary | ICD-10-CM

## 2021-11-11 PROCEDURE — 97112 NEUROMUSCULAR REEDUCATION: CPT

## 2021-11-11 PROCEDURE — 97140 MANUAL THERAPY 1/> REGIONS: CPT

## 2021-11-11 PROCEDURE — 97110 THERAPEUTIC EXERCISES: CPT

## 2021-11-11 PROCEDURE — 97530 THERAPEUTIC ACTIVITIES: CPT

## 2021-11-16 ENCOUNTER — OFFICE VISIT (OUTPATIENT)
Dept: PHYSICAL THERAPY | Facility: CLINIC | Age: 51
End: 2021-11-16
Payer: OTHER MISCELLANEOUS

## 2021-11-16 DIAGNOSIS — M54.6 CHRONIC MIDLINE THORACIC BACK PAIN: ICD-10-CM

## 2021-11-16 DIAGNOSIS — G89.29 CHRONIC RIGHT SHOULDER PAIN: ICD-10-CM

## 2021-11-16 DIAGNOSIS — G89.29 CHRONIC MIDLINE THORACIC BACK PAIN: ICD-10-CM

## 2021-11-16 DIAGNOSIS — M25.552 LEFT HIP PAIN: Primary | ICD-10-CM

## 2021-11-16 DIAGNOSIS — M54.50 CHRONIC LEFT-SIDED LOW BACK PAIN, UNSPECIFIED WHETHER SCIATICA PRESENT: ICD-10-CM

## 2021-11-16 DIAGNOSIS — G89.29 CHRONIC LEFT SHOULDER PAIN: ICD-10-CM

## 2021-11-16 DIAGNOSIS — M75.41 IMPINGEMENT SYNDROME OF RIGHT SHOULDER: Primary | ICD-10-CM

## 2021-11-16 DIAGNOSIS — G89.29 CHRONIC LEFT-SIDED LOW BACK PAIN, UNSPECIFIED WHETHER SCIATICA PRESENT: ICD-10-CM

## 2021-11-16 DIAGNOSIS — M25.511 CHRONIC RIGHT SHOULDER PAIN: ICD-10-CM

## 2021-11-16 DIAGNOSIS — M25.512 CHRONIC LEFT SHOULDER PAIN: ICD-10-CM

## 2021-11-16 PROCEDURE — 97140 MANUAL THERAPY 1/> REGIONS: CPT

## 2021-11-16 PROCEDURE — 97112 NEUROMUSCULAR REEDUCATION: CPT

## 2021-11-16 PROCEDURE — 97110 THERAPEUTIC EXERCISES: CPT

## 2021-11-16 PROCEDURE — 97530 THERAPEUTIC ACTIVITIES: CPT

## 2021-11-18 ENCOUNTER — OFFICE VISIT (OUTPATIENT)
Dept: PHYSICAL THERAPY | Facility: CLINIC | Age: 51
End: 2021-11-18
Payer: OTHER MISCELLANEOUS

## 2021-11-18 ENCOUNTER — APPOINTMENT (OUTPATIENT)
Dept: PHYSICAL THERAPY | Facility: CLINIC | Age: 51
End: 2021-11-18
Payer: OTHER MISCELLANEOUS

## 2021-11-18 DIAGNOSIS — M25.512 CHRONIC LEFT SHOULDER PAIN: ICD-10-CM

## 2021-11-18 DIAGNOSIS — M25.511 CHRONIC RIGHT SHOULDER PAIN: ICD-10-CM

## 2021-11-18 DIAGNOSIS — G89.29 CHRONIC LEFT SHOULDER PAIN: ICD-10-CM

## 2021-11-18 DIAGNOSIS — M75.41 IMPINGEMENT SYNDROME OF RIGHT SHOULDER: Primary | ICD-10-CM

## 2021-11-18 DIAGNOSIS — M54.6 CHRONIC MIDLINE THORACIC BACK PAIN: ICD-10-CM

## 2021-11-18 DIAGNOSIS — M54.50 CHRONIC LEFT-SIDED LOW BACK PAIN, UNSPECIFIED WHETHER SCIATICA PRESENT: ICD-10-CM

## 2021-11-18 DIAGNOSIS — G89.29 CHRONIC MIDLINE THORACIC BACK PAIN: ICD-10-CM

## 2021-11-18 DIAGNOSIS — G89.29 CHRONIC RIGHT SHOULDER PAIN: ICD-10-CM

## 2021-11-18 DIAGNOSIS — M25.552 LEFT HIP PAIN: ICD-10-CM

## 2021-11-18 DIAGNOSIS — G89.29 CHRONIC LEFT-SIDED LOW BACK PAIN, UNSPECIFIED WHETHER SCIATICA PRESENT: ICD-10-CM

## 2021-11-18 PROCEDURE — 97140 MANUAL THERAPY 1/> REGIONS: CPT | Performed by: PHYSICAL THERAPIST

## 2021-11-18 PROCEDURE — 97110 THERAPEUTIC EXERCISES: CPT

## 2021-11-18 PROCEDURE — 97110 THERAPEUTIC EXERCISES: CPT | Performed by: PHYSICAL THERAPIST

## 2021-11-18 PROCEDURE — 97112 NEUROMUSCULAR REEDUCATION: CPT

## 2021-11-18 PROCEDURE — 97112 NEUROMUSCULAR REEDUCATION: CPT | Performed by: PHYSICAL THERAPIST

## 2021-11-23 ENCOUNTER — APPOINTMENT (OUTPATIENT)
Dept: PHYSICAL THERAPY | Facility: CLINIC | Age: 51
End: 2021-11-23
Payer: OTHER MISCELLANEOUS

## 2021-11-26 ENCOUNTER — APPOINTMENT (OUTPATIENT)
Dept: PHYSICAL THERAPY | Facility: CLINIC | Age: 51
End: 2021-11-26
Payer: OTHER MISCELLANEOUS

## 2021-11-29 ENCOUNTER — APPOINTMENT (OUTPATIENT)
Dept: PHYSICAL THERAPY | Facility: CLINIC | Age: 51
End: 2021-11-29
Payer: OTHER MISCELLANEOUS

## 2021-12-09 ENCOUNTER — OFFICE VISIT (OUTPATIENT)
Dept: PHYSICAL THERAPY | Facility: CLINIC | Age: 51
End: 2021-12-09
Payer: OTHER MISCELLANEOUS

## 2021-12-09 DIAGNOSIS — M54.50 CHRONIC LEFT-SIDED LOW BACK PAIN, UNSPECIFIED WHETHER SCIATICA PRESENT: ICD-10-CM

## 2021-12-09 DIAGNOSIS — M25.512 CHRONIC LEFT SHOULDER PAIN: Primary | ICD-10-CM

## 2021-12-09 DIAGNOSIS — G89.29 CHRONIC LEFT SHOULDER PAIN: Primary | ICD-10-CM

## 2021-12-09 DIAGNOSIS — G89.29 CHRONIC LEFT-SIDED LOW BACK PAIN, UNSPECIFIED WHETHER SCIATICA PRESENT: ICD-10-CM

## 2021-12-09 DIAGNOSIS — M25.552 LEFT HIP PAIN: ICD-10-CM

## 2021-12-09 PROCEDURE — 97112 NEUROMUSCULAR REEDUCATION: CPT

## 2021-12-09 PROCEDURE — 97110 THERAPEUTIC EXERCISES: CPT

## 2021-12-13 ENCOUNTER — APPOINTMENT (OUTPATIENT)
Dept: PHYSICAL THERAPY | Facility: CLINIC | Age: 51
End: 2021-12-13
Payer: OTHER MISCELLANEOUS

## 2021-12-16 ENCOUNTER — APPOINTMENT (OUTPATIENT)
Dept: PHYSICAL THERAPY | Facility: CLINIC | Age: 51
End: 2021-12-16
Payer: OTHER MISCELLANEOUS

## 2021-12-21 ENCOUNTER — EVALUATION (OUTPATIENT)
Dept: PHYSICAL THERAPY | Facility: CLINIC | Age: 51
End: 2021-12-21
Payer: OTHER MISCELLANEOUS

## 2021-12-21 DIAGNOSIS — M25.552 LEFT HIP PAIN: ICD-10-CM

## 2021-12-21 DIAGNOSIS — M54.50 CHRONIC LEFT-SIDED LOW BACK PAIN, UNSPECIFIED WHETHER SCIATICA PRESENT: ICD-10-CM

## 2021-12-21 DIAGNOSIS — G89.29 CHRONIC LEFT SHOULDER PAIN: Primary | ICD-10-CM

## 2021-12-21 DIAGNOSIS — M25.512 CHRONIC LEFT SHOULDER PAIN: Primary | ICD-10-CM

## 2021-12-21 DIAGNOSIS — G89.29 CHRONIC LEFT-SIDED LOW BACK PAIN, UNSPECIFIED WHETHER SCIATICA PRESENT: ICD-10-CM

## 2021-12-21 PROCEDURE — 97110 THERAPEUTIC EXERCISES: CPT | Performed by: PHYSICAL THERAPIST

## 2021-12-21 PROCEDURE — 97140 MANUAL THERAPY 1/> REGIONS: CPT | Performed by: PHYSICAL THERAPIST

## 2021-12-28 ENCOUNTER — OFFICE VISIT (OUTPATIENT)
Dept: PHYSICAL THERAPY | Facility: CLINIC | Age: 51
End: 2021-12-28
Payer: OTHER MISCELLANEOUS

## 2021-12-28 DIAGNOSIS — M25.512 CHRONIC LEFT SHOULDER PAIN: Primary | ICD-10-CM

## 2021-12-28 DIAGNOSIS — G89.29 CHRONIC LEFT SHOULDER PAIN: Primary | ICD-10-CM

## 2021-12-28 DIAGNOSIS — M54.50 CHRONIC LEFT-SIDED LOW BACK PAIN, UNSPECIFIED WHETHER SCIATICA PRESENT: ICD-10-CM

## 2021-12-28 DIAGNOSIS — M25.552 LEFT HIP PAIN: ICD-10-CM

## 2021-12-28 DIAGNOSIS — G89.29 CHRONIC LEFT-SIDED LOW BACK PAIN, UNSPECIFIED WHETHER SCIATICA PRESENT: ICD-10-CM

## 2021-12-28 PROCEDURE — 97140 MANUAL THERAPY 1/> REGIONS: CPT | Performed by: PHYSICAL THERAPIST

## 2021-12-28 PROCEDURE — 97110 THERAPEUTIC EXERCISES: CPT | Performed by: PHYSICAL THERAPIST

## 2021-12-30 ENCOUNTER — OFFICE VISIT (OUTPATIENT)
Dept: PHYSICAL THERAPY | Facility: CLINIC | Age: 51
End: 2021-12-30
Payer: OTHER MISCELLANEOUS

## 2021-12-30 DIAGNOSIS — G89.29 CHRONIC LEFT SHOULDER PAIN: Primary | ICD-10-CM

## 2021-12-30 DIAGNOSIS — M25.512 CHRONIC LEFT SHOULDER PAIN: Primary | ICD-10-CM

## 2021-12-30 DIAGNOSIS — G89.29 CHRONIC LEFT-SIDED LOW BACK PAIN, UNSPECIFIED WHETHER SCIATICA PRESENT: ICD-10-CM

## 2021-12-30 DIAGNOSIS — M54.50 CHRONIC LEFT-SIDED LOW BACK PAIN, UNSPECIFIED WHETHER SCIATICA PRESENT: ICD-10-CM

## 2021-12-30 DIAGNOSIS — M25.552 LEFT HIP PAIN: ICD-10-CM

## 2021-12-30 PROCEDURE — 97110 THERAPEUTIC EXERCISES: CPT

## 2021-12-30 PROCEDURE — 97140 MANUAL THERAPY 1/> REGIONS: CPT

## 2022-01-04 ENCOUNTER — OFFICE VISIT (OUTPATIENT)
Dept: PHYSICAL THERAPY | Facility: CLINIC | Age: 52
End: 2022-01-04
Payer: OTHER MISCELLANEOUS

## 2022-01-04 DIAGNOSIS — M54.50 CHRONIC LEFT-SIDED LOW BACK PAIN, UNSPECIFIED WHETHER SCIATICA PRESENT: ICD-10-CM

## 2022-01-04 DIAGNOSIS — M25.552 LEFT HIP PAIN: ICD-10-CM

## 2022-01-04 DIAGNOSIS — G89.29 CHRONIC LEFT-SIDED LOW BACK PAIN, UNSPECIFIED WHETHER SCIATICA PRESENT: ICD-10-CM

## 2022-01-04 DIAGNOSIS — G89.29 CHRONIC LEFT SHOULDER PAIN: Primary | ICD-10-CM

## 2022-01-04 DIAGNOSIS — M25.512 CHRONIC LEFT SHOULDER PAIN: Primary | ICD-10-CM

## 2022-01-04 PROCEDURE — 97530 THERAPEUTIC ACTIVITIES: CPT

## 2022-01-04 PROCEDURE — 97110 THERAPEUTIC EXERCISES: CPT

## 2022-01-04 PROCEDURE — 97140 MANUAL THERAPY 1/> REGIONS: CPT

## 2022-01-04 NOTE — PROGRESS NOTES
Daily Note     Today's date: 2022  Patient name: Marko Caputo  : 1970  MRN: [id number]  Referring provider: James Smith MD  Dx:   Encounter Diagnosis     ICD-10-CM    1  Chronic left shoulder pain  M25 512     G89 29    2  Chronic left-sided low back pain, unspecified whether sciatica present  M54 50     G89 29    3  Left hip pain  M25 552        Start Time: 08  Stop Time: 933  Total time in clinic (min): 48 minutes    Subjective: Patient reports his shoulder is feeling a little better due to the steroids he has been on  Reports that his back bothered him more over the w/k, especially w/ bending down  Objective: See treatment diary below      Assessment: Tolerated treatment well  Patient demonstrated fatigue post treatment and would benefit from continued PT  Focused on back exercises today, mainly w/ bending  Cueing for correct technique w/ hip hinges and STS  Improved mobility in the shoulder  Good tolerance t/o session  Plan: Continue per plan of care        Diagnosis: L shoulder, L hip, and low back pain   Precautions:    Primary Goals: Return to ADLs and work duties () without pain/limitation   POC Expires: 22   Re-evaluation Date:    FOTO Scores/Date: 21 - L shoulder 52, L hip 61, low back 37   Visit Count 1/10 2/10 3/10 410    Manuals 21    L shoulder PROM all planes [facility]    Posterior humeral glide   Gr 1-3 KD Gr 1-3                             Ther Ex  21    Supine I, Y  2# 5x10" ea 2# 10x 10" ea     Supine flexion  2# 2x10 2# 2x10 close/far  2# 2x10 close/far     SL shoulder abd  1# 2x10 1# 2x10     SL ER  1# 2x10 1# 2x10     Wall wash  10x3" ea (flex/scaption) nv 10x 5"    Standing cane 3-way  10x ea 10x ea     IR belt stretch  10x10" nv     UBE   4/4 4/4    Chest press w/ cane   5# 2x10     Bike    10'                    Re-eval Re-assessed L shoulder, L hip, low back Neuro Re-Ed                                                                                                                       Ther Act              Hip hinging        10x w/ cueing      STS       2x10 w/ cueing     Modalities

## 2022-01-06 ENCOUNTER — OFFICE VISIT (OUTPATIENT)
Dept: PHYSICAL THERAPY | Facility: CLINIC | Age: 52
End: 2022-01-06
Payer: OTHER MISCELLANEOUS

## 2022-01-06 DIAGNOSIS — M25.552 LEFT HIP PAIN: ICD-10-CM

## 2022-01-06 DIAGNOSIS — G89.29 CHRONIC LEFT SHOULDER PAIN: Primary | ICD-10-CM

## 2022-01-06 DIAGNOSIS — M25.512 CHRONIC LEFT SHOULDER PAIN: Primary | ICD-10-CM

## 2022-01-06 DIAGNOSIS — G89.29 CHRONIC LEFT-SIDED LOW BACK PAIN, UNSPECIFIED WHETHER SCIATICA PRESENT: ICD-10-CM

## 2022-01-06 DIAGNOSIS — M54.50 CHRONIC LEFT-SIDED LOW BACK PAIN, UNSPECIFIED WHETHER SCIATICA PRESENT: ICD-10-CM

## 2022-01-06 PROCEDURE — 97530 THERAPEUTIC ACTIVITIES: CPT

## 2022-01-06 PROCEDURE — 97112 NEUROMUSCULAR REEDUCATION: CPT

## 2022-01-06 PROCEDURE — 97110 THERAPEUTIC EXERCISES: CPT

## 2022-01-06 NOTE — PROGRESS NOTES
Daily Note     Today's date: 2022  Patient name: Gloria Green  : 1970  MRN: [id number]  Referring provider: Riya Morales MD  Dx:   Encounter Diagnosis     ICD-10-CM    1  Chronic left shoulder pain  M25 512     G89 29    2  Chronic left-sided low back pain, unspecified whether sciatica present  M54 50     G89 29    3  Left hip pain  M25 552                   Subjective: Patient reports his back is a little sore following his last visit  Objective: See treatment diary below      Assessment: Tolerated treatment fair  Added serratus punch to improve GH rhythm  Patient did well with STS, no need for VC  Added dowel for tactile cue for improved hip hinge  No increased pain with exercises outlined below  Patient would benefit from continued PT      Plan: Progress treatment as tolerated         Diagnosis: L shoulder, L hip, and low back pain   Precautions:    Primary Goals: Return to ADLs and work duties () without pain/limitation   POC Expires: 22   Re-evaluation Date:    FOTO Scores/Date: 21 - L shoulder 52, L hip 61, low back 37   Visit Count 1/10 2/10 3/10 4/10 5/10   Manuals 21   L shoulder PROM [facility] planes [facility]   Posterior humeral glide   Gr 1-3  Gr 1-3                             Ther Ex  21 1/4    Supine I, Y  2# 5x10" ea 2# 10x 10" ea  2# 10x 10" ea   Supine flexion  2# 2x10 2# 2x10 close/far  2# 2x10 close/far  2# 2x10 close/far    SL shoulder abd  1# 2x10 1# 2x10     SL ER  1# 2x10 1# 2x10     Wall wash  10x3" ea (flex/scaption) nv 10x 5" 10x5"   Standing cane 3-way  10x ea 10x ea     IR belt stretch  10x10" nv     UBE   4/4 4/4 4'/4'   Chest press w/ cane   5# 2x10  5# 2x10   Bike    10' 10'   Serratus punches     1# 20x           Re-eval Re-assessed L shoulder, L hip, low back       Neuro Re-Ed Ther Act              Hip hinging        10x w/ cueing  10x w/ dowel    STS       2x10 w/ cueing  2x10    Modalities

## 2022-01-11 ENCOUNTER — OFFICE VISIT (OUTPATIENT)
Dept: PHYSICAL THERAPY | Facility: CLINIC | Age: 52
End: 2022-01-11
Payer: OTHER MISCELLANEOUS

## 2022-01-11 DIAGNOSIS — G89.29 CHRONIC LEFT SHOULDER PAIN: Primary | ICD-10-CM

## 2022-01-11 DIAGNOSIS — M25.552 LEFT HIP PAIN: ICD-10-CM

## 2022-01-11 DIAGNOSIS — M54.50 CHRONIC LEFT-SIDED LOW BACK PAIN, UNSPECIFIED WHETHER SCIATICA PRESENT: ICD-10-CM

## 2022-01-11 DIAGNOSIS — G89.29 CHRONIC LEFT-SIDED LOW BACK PAIN, UNSPECIFIED WHETHER SCIATICA PRESENT: ICD-10-CM

## 2022-01-11 DIAGNOSIS — M25.512 CHRONIC LEFT SHOULDER PAIN: Primary | ICD-10-CM

## 2022-01-11 PROCEDURE — 97110 THERAPEUTIC EXERCISES: CPT

## 2022-01-11 PROCEDURE — 97140 MANUAL THERAPY 1/> REGIONS: CPT

## 2022-01-11 PROCEDURE — 97112 NEUROMUSCULAR REEDUCATION: CPT

## 2022-01-11 NOTE — PROGRESS NOTES
Daily Note     Today's date: 2022  Patient name: Airel Ruiz  : 1970  MRN: [id number]  Referring provider: Zuhair Serna MD  Dx:   Encounter Diagnosis     ICD-10-CM    1  Chronic left shoulder pain  M25 512     G89 29    2  Chronic left-sided low back pain, unspecified whether sciatica present  M54 50     G89 29    3  Left hip pain  M25 552        Start Time: 1100  Stop Time: 1150  Total time in clinic (min): 50 minutes    Subjective: Patient reports that his shoulder is coming along, mobility is improving  Objective: See treatment diary below      Assessment: Tolerated treatment well  Patient exhibited good technique with therapeutic exercises and would benefit from continued PT  Unable to tolerate shoulder press secondary to pain  Added wall clocks to work on reaching and strength  PROM is improving nicely  Plan: Continue per plan of care  Diagnosis: L shoulder, L hip, and low back pain   Precautions:    Primary Goals: Return to ADLs and work duties () without pain/limitation   POC Expires: 22   Re-evaluation Date:    FOTO Scores/Date: 21 - L shoulder 52, L hip 61, low back 37   Visit Count 6/10 210 310 4/10 5/10   Manuals 21   L shoulder PROM all planes [facility]   Posterior humeral glide  KD Gr 1-3 KD Gr 1-3                             Ther Ex  21 1    Supine I, Y 3# 2x10 ea 2# 5x10" ea 2# 10x 10" ea  2# 10x 10" ea   Supine flexion  2# 2x10 2# 2x10 close/far  2# 2x10 close/far  2# 2x10 close/far    SL shoulder abd  1# 2x10 1# 2x10     SL ER  1# 2x10 1# 2x10     Wall wash  10x3" ea (flex/scaption) nv 10x 5" 10x5"   Standing cane 3-way  10x ea 10x ea     IR belt stretch  10x10" nv     UBE 4/4  4/4 4/4 4'/4'   Chest press w/ cane   5# 2x10  5# 2x10   Bike 10'   10' 10'   Serratus punches     1# 20x   Shoulder press 2# 2x p!        Re-eval        Neuro Re-Ed        Wall clocks ytb 10x       Shoulder 4 way Green 2x10 ea                                                                                                     Ther Act             Hip hinging        10x w/ cueing  10x w/ dowel    STS       2x10 w/ cueing  2x10    Modalities

## 2022-01-13 ENCOUNTER — OFFICE VISIT (OUTPATIENT)
Dept: PHYSICAL THERAPY | Facility: CLINIC | Age: 52
End: 2022-01-13
Payer: OTHER MISCELLANEOUS

## 2022-01-13 DIAGNOSIS — G89.29 CHRONIC LEFT-SIDED LOW BACK PAIN, UNSPECIFIED WHETHER SCIATICA PRESENT: ICD-10-CM

## 2022-01-13 DIAGNOSIS — M54.50 CHRONIC LEFT-SIDED LOW BACK PAIN, UNSPECIFIED WHETHER SCIATICA PRESENT: ICD-10-CM

## 2022-01-13 DIAGNOSIS — M25.552 LEFT HIP PAIN: ICD-10-CM

## 2022-01-13 DIAGNOSIS — M25.512 CHRONIC LEFT SHOULDER PAIN: Primary | ICD-10-CM

## 2022-01-13 DIAGNOSIS — G89.29 CHRONIC LEFT SHOULDER PAIN: Primary | ICD-10-CM

## 2022-01-13 PROCEDURE — 97140 MANUAL THERAPY 1/> REGIONS: CPT | Performed by: PHYSICAL THERAPIST

## 2022-01-13 PROCEDURE — 97110 THERAPEUTIC EXERCISES: CPT | Performed by: PHYSICAL THERAPIST

## 2022-01-13 NOTE — PROGRESS NOTES
Daily Note     Today's date: 2022  Patient name: Daphne Alfaro  : 1970  MRN: [id number]  Referring provider: Arnaldo Hopkins MD  Dx:   Encounter Diagnosis     ICD-10-CM    1  Chronic left shoulder pain  M25 512     G89 29    2  Chronic left-sided low back pain, unspecified whether sciatica present  M54 50     G89 29    3  Left hip pain  M25 552        Start Time: 08  Stop Time: 2603  Total time in clinic (min): 58 minutes    Subjective: Pt reports increased low back pain/stiffness, is to follow up with his MD on Saturday  Objective: See treatment diary below      Assessment: Pt demonstrates significant improvement in quality/quantity of manual PROM all planes with continued pain at end ranges but improving with therapy  Pt remains easily challenged and slightly painful with mat table strengthening but able to complete with any increase in pain dissipating shortly after completion of exercise  Pt with much improved mobility during wall wash and was able to add lift off component at end range with extensive cues to avoid compensatory elbow flexion/trunk leaning  Pt also able to add 4-way [place] steamboats but modified ROM in which exercise was completed to improve form and tolerance and he remains very weak with OH activities  Will continue to progress as able  Plan: Continue per plan of care  Progress treatment as tolerated         Diagnosis: L shoulder, L hip, and low back pain   Precautions:    Primary Goals: Return to ADLs and work duties () without pain/limitation   POC Expires: 22   Re-evaluation Date:    FOTO Scores/Date: 21 - L shoulder 52, L hip 61, low back 37   Visit Count 6/10 7/10 3/10 4/10 5/10   Manuals 21   L shoulder PROM all planes [facility] JANI   Posterior humeral glide  KD Gr 1-3 KD Gr 1-3                             Ther Ex      Supine I, Y 3# 2x10 ea 2# 5x10" ea 2# 10x 10" ea  2# 10x 10" ea Supine flexion  2# 2x10 2# 2x10 close/far  2# 2x10 close/far  2# 2x10 close/far    SL shoulder abd  2# 2x10 1# 2x10     SL ER  2# 2x10 1# 2x10     Wall wash  4x5 ea w/ lift off (flex/scaption) nv 10x 5" 10x5"   Ball roll up wall  8# 4x5      IR belt stretch  10x10" nv     UBE 4/4 4/4 L2 4/4 4/4 4'/4'   4-way [place] steamboats  Yellow 20x ea      Bike 10'   10' 10'   Serratus punches     1# 20x   Shoulder press 2# 2x p!        Re-eval        Neuro Re-Ed        Wall clocks ytb 10x       Shoulder 4 way Green 2x10 ea                                                                                                     Ther Act             Hip hinging        10x w/ cueing  10x w/ dowel    STS       2x10 w/ cueing  2x10    Modalities

## 2022-01-18 ENCOUNTER — OFFICE VISIT (OUTPATIENT)
Dept: PHYSICAL THERAPY | Facility: CLINIC | Age: 52
End: 2022-01-18
Payer: OTHER MISCELLANEOUS

## 2022-01-18 DIAGNOSIS — M54.50 CHRONIC LEFT-SIDED LOW BACK PAIN, UNSPECIFIED WHETHER SCIATICA PRESENT: ICD-10-CM

## 2022-01-18 DIAGNOSIS — G89.29 CHRONIC LEFT SHOULDER PAIN: Primary | ICD-10-CM

## 2022-01-18 DIAGNOSIS — M25.552 LEFT HIP PAIN: ICD-10-CM

## 2022-01-18 DIAGNOSIS — M25.512 CHRONIC LEFT SHOULDER PAIN: Primary | ICD-10-CM

## 2022-01-18 DIAGNOSIS — G89.29 CHRONIC LEFT-SIDED LOW BACK PAIN, UNSPECIFIED WHETHER SCIATICA PRESENT: ICD-10-CM

## 2022-01-18 PROCEDURE — 97140 MANUAL THERAPY 1/> REGIONS: CPT

## 2022-01-18 PROCEDURE — 97110 THERAPEUTIC EXERCISES: CPT

## 2022-01-18 NOTE — PROGRESS NOTES
Daily Note     Today's date: 2022  Patient name: Ariel Ruiz  : 1970  MRN: [id number]  Referring provider: Zuhair Serna MD  Dx:   Encounter Diagnosis     ICD-10-CM    1  Chronic left shoulder pain  M25 512     G89 29    2  Chronic left-sided low back pain, unspecified whether sciatica present  M54 50     G89 29    3  Left hip pain  M25 552        Start Time: 0930  Stop Time: 1020  Total time in clinic (min): 50 minutes    Subjective: Patient reports his L shoulder is feeling a little sore today  Objective: See treatment diary below      Assessment: Tolerated treatment well  Patient exhibited good technique with therapeutic exercises and would benefit from continued PT  Patient was challenged w/ shoulder and scap stabilization exercises  Difficulty w/ steamboats due to weakness  Improved ER/IR mobility noted  Plan: Continue per plan of care  Diagnosis: L shoulder, L hip, and low back pain   Precautions:    Primary Goals: Return to ADLs and work duties () without pain/limitation   POC Expires: 22   Re-evaluation Date:    FOTO Scores/Date: 21 - L shoulder 52, L hip 61, low back 37   Visit Count 6/10 7/10 8/10 4/10 5/10   Manuals 22   L shoulder PROM all planes [facility] C[facility] KD [facility] JANI   Posterior humeral glide   Gr 1-3                              Ther Ex      Supine I, Y 3# 2x10 ea 2# 5x10" ea 2# 10x  2# 10x 10" ea   Supine flexion  2# 2x10 2# 10x 2# 2x10 close/far  2# 2x10 close/far    SL shoulder abd  2# 2x10 2# 10x2     SL ER  2# 2x10 2# 2x10     Wall wash  4x5 ea w/ lift off (flex/scaption) 3x5 flex/scap 10x 5" 10x5"   Ball roll up wall  8# 4x5      IR belt stretch  10x10"      UBE /4 4/4 L2 4/4 4/4 4'/4'   4-way OH steamboats  Yellow 20x ea Yellow 20x ea     Bike 10'   10' 10'   Serratus punches     1# 20x   Shoulder press 2# 2x p!        Re-eval        Neuro Re-Ed        Wall clocks ytb 10x       Shoulder 4 way Green 2x10 ea       D2 flexion   ytb supine 2x10                                                                                          Ther Act             Hip hinging        10x w/ cueing  10x w/ dowel    STS       2x10 w/ cueing  2x10    Modalities

## 2022-01-20 ENCOUNTER — APPOINTMENT (OUTPATIENT)
Dept: PHYSICAL THERAPY | Facility: CLINIC | Age: 52
End: 2022-01-20
Payer: OTHER MISCELLANEOUS

## 2022-01-25 ENCOUNTER — APPOINTMENT (OUTPATIENT)
Dept: PHYSICAL THERAPY | Facility: CLINIC | Age: 52
End: 2022-01-25
Payer: OTHER MISCELLANEOUS

## 2022-01-25 ENCOUNTER — OFFICE VISIT (OUTPATIENT)
Dept: PHYSICAL THERAPY | Facility: CLINIC | Age: 52
End: 2022-01-25
Payer: OTHER MISCELLANEOUS

## 2022-01-25 ENCOUNTER — EVALUATION (OUTPATIENT)
Dept: PHYSICAL THERAPY | Facility: CLINIC | Age: 52
End: 2022-01-25
Payer: OTHER MISCELLANEOUS

## 2022-01-25 DIAGNOSIS — M25.512 CHRONIC LEFT SHOULDER PAIN: Primary | ICD-10-CM

## 2022-01-25 DIAGNOSIS — G89.29 CHRONIC LEFT SHOULDER PAIN: Primary | ICD-10-CM

## 2022-01-25 DIAGNOSIS — M25.511 CHRONIC RIGHT SHOULDER PAIN: Primary | ICD-10-CM

## 2022-01-25 DIAGNOSIS — G89.29 CHRONIC LEFT-SIDED LOW BACK PAIN, UNSPECIFIED WHETHER SCIATICA PRESENT: ICD-10-CM

## 2022-01-25 DIAGNOSIS — M54.50 CHRONIC LEFT-SIDED LOW BACK PAIN, UNSPECIFIED WHETHER SCIATICA PRESENT: ICD-10-CM

## 2022-01-25 DIAGNOSIS — M25.552 LEFT HIP PAIN: ICD-10-CM

## 2022-01-25 DIAGNOSIS — G89.29 CHRONIC RIGHT SHOULDER PAIN: Primary | ICD-10-CM

## 2022-01-25 PROCEDURE — 97112 NEUROMUSCULAR REEDUCATION: CPT

## 2022-01-25 PROCEDURE — 97140 MANUAL THERAPY 1/> REGIONS: CPT | Performed by: PHYSICAL THERAPIST

## 2022-01-25 PROCEDURE — 97140 MANUAL THERAPY 1/> REGIONS: CPT

## 2022-01-25 PROCEDURE — 97530 THERAPEUTIC ACTIVITIES: CPT

## 2022-01-25 PROCEDURE — 97110 THERAPEUTIC EXERCISES: CPT

## 2022-01-25 PROCEDURE — 97164 PT RE-EVAL EST PLAN CARE: CPT | Performed by: PHYSICAL THERAPIST

## 2022-01-25 NOTE — LETTER
2022    Laney Newman MD   [facility] [address]    Patient: Saroj Vaughan   YOB: 1970   Date of Visit: 2022     Encounter Diagnosis     ICD-10-CM    1  Chronic right shoulder pain  M25 511     G89 29        Dear Dr Cherry Blend:    Thank you for your recent referral of Saroj Vaughan  Please review the attached evaluation summary from [facility]'s recent visit  Please verify that you agree with the plan of care by signing the attached order  If you have any questions or concerns, please do not hesitate to call  I sincerely appreciate the opportunity to share in the care of one of your patients and hope to have another opportunity to work with you in the near future  Sincerely,    Gold Wiggins, PT      Referring Provider:      I certify that I have read the below Plan of Care and certify the need for these services furnished under this plan of treatment while under my care  Laney Newman MD   [facility] [address]  Via Fax: [phone]          PT Re-Evaluation     Today's date: 2022  Patient name: Saroj Vaughan  : 1970  MRN: [id number]  Referring provider: Sherryle Brightly, MD  Dx:   Encounter Diagnosis     ICD-10-CM    1  Chronic right shoulder pain  M25 511     G89 29        Start Time: 1016  Stop Time: 1050  Total time in clinic (min): 34 minutes    Assessment  Assessment details: Patient is a 45 y o  male who presents to physical therapy with c/o right shoulder pain consistent with subacromial pain syndrome  Patient presents to evaluation with pain, decreased range of motion, decreased strength, and decreased tolerance to activity  Pt was being treated for right shoulder previously but had lapse of care for 2 months while waiting on insurance form and MD follow up to resume therapy   I discussed risks, benefits, and alternatives to treatment, and answered all patient questions to patient satisfaction  Patient presents with baseline FOTO score of 57 indicating limited tolerance/ability to complete ADLs  Patient is an appropriate candidate for skilled PT and would benefit from skilled PT services to address the aforementioned impairments, achieve goals, maximize function, and improve quality of life  Pt is in agreement with this plan      Patient Education: activity modifications as needed, pacing of activities, importance of HEP compliance, PT prognosis/POC    Impairments: abnormal or restricted ROM, activity intolerance, impaired physical strength, lacks appropriate home exercise program, pain with function and poor posture   Understanding of Dx/Px/POC: good   Prognosis: good    Goals  ST weeks  Pt will demonstrate good understanding and compliance with HEP  Pt will decrease pain to 3-4/10    Pt will demonstrate improved postural awareness and ability to self-correct without reliance on external cues    LT weeks  Pt will improve FOTO score to > or = to 60 to indicate improved functional abilities   Pt will decrease pain to 1-2/10   Pt will increase shoulder strength to 4+/5 for improved tolerance/independence with ADLs  Pt will increase PROM to WNL to allow for return of AROM of affected shoulder  Pt will increase shoulder flexion/abduction to 160 degrees to increase independence with ADLs   Pt will increase functional IR to be able to don/doff belt and wash back without pain          Plan  Patient would benefit from: PT eval and skilled physical therapy  Planned modality interventions: cryotherapy and thermotherapy: hydrocollator packs  Planned therapy interventions: ADL training, manual therapy, neuromuscular re-education, strengthening, stretching, therapeutic activities, therapeutic exercise, home exercise program, graded exercise, graded activity, functional ROM exercises, flexibility, body mechanics training, patient education and postural training  Frequency: 2x week  Duration in weeks: 6  Plan of Care beginning date: 2022  Plan of Care expiration date: 3/8/2022  Treatment plan discussed with: patient        Subjective Evaluation    History of Present Illness  Mechanism of injury: Pt returns to PT with c/o right shoulder pain that started after a work incident in   Pt later sustained another injury to left shoulder at work that caused him to have surgery in May and is still undergoing therapy for this  Pt followed up with his MD recently and was offered surgery for his right shoulder but pt feels he would rather treat conservatively with PT at this point  Pt reports increased right shoulder pain/limitations with heavy lifting, reaching overhead, grabbing/pulling  Pt is right hand dominant  Pt does have some difficulty with sleeping at night due to pain  Pt denies N/T into extremities     Pain  Current pain ratin  At best pain ratin  At worst pain ratin  Quality: dull ache    Hand dominance: right    Treatments  Previous treatment: physical therapy  Patient Goals  Patient goals for therapy: decreased pain, increased motion, increased strength, independence with ADLs/IADLs, return to work and return to sport/leisure activities          Objective     General Comments:      Shoulder Comments   Posture: FHP, RS, increased thoracic kyphosis     Right shoulder AROM: Flex 160* (pain) Abd 125* (pain) Functional IR to T10 (pain) Functional ER to T3    Right shoulder PROM: Flex 170* Abd 170* IR 70* ER 90*    Right shoulder MMT: Flex 4-/5 (pain) Abd 4-/5 (pain) IR 5-/5 (pain) ER 4+/5 (pain)    Light touch intact and symmetrical bilateral UEs    Duncan-Avery: (+)  Empty can: (+)    FOTO: 57             Diagnosis: R shoulder pain   Precautions: -   Primary Goals: Return to normal ADLs/work duties without pain or limitation   POC Expires: 3/8/22   Re-evaluation Date: 3/8/22   FOTO Scores/Date: Goal 60; 22 - 57   Visit Count 1/10       Manuals        R shoulder PROM all planes per tolerance  KD                                       Ther Ex                                                                                Neuro Re-Ed                                                                                                                       Ther Act                                         Modalities

## 2022-01-25 NOTE — PROGRESS NOTES
Daily Note     Today's date: 2022  Patient name: Kishan Prado  : 1970  MRN: [id number]  Referring provider: No ref  provider found  Dx:   Encounter Diagnosis     ICD-10-CM    1  Chronic left shoulder pain  M25 512     G89 29    2  Chronic left-sided low back pain, unspecified whether sciatica present  M54 50     G89 29    3  Left hip pain  M25 552                   Subjective: Patient reports cont pain with overhead lifting on L shoulder, his L hip and back cont to bother him with certain movements  Objective: See treatment diary below      Assessment: Tolerated treatment fair  Empty end feel flex with L shldr PROM due to pain  Patient cont to experience discomfort with OH motions  Lacks eccentric control with wall wash  Re-initiated exercises for back as patient has been experiencing more discomfort  Performed STS in a slow strategic manner to decrease discomfort  Cuing for core brace prior to standing  Patient demonstrated fatigue post treatment and would benefit from continued PT      Plan: Continue per plan of care        Diagnosis: L shoulder, L hip, and low back pain   Precautions:    Primary Goals: Return to ADLs and work duties () without pain/limitation   POC Expires: 22   Re-evaluation Date:    FOTO Scores/Date: 21 - L shoulder 52, L hip 61, low back 37   Visit Count 6/10 710 810 910 5/10   Manuals 22   L shoulder PROM all planes [facility] CHELSEA KD [facility] CHELSEA JANI JANI   Posterior humeral glide   Gr 1-3                              Ther Ex        Supine I, Y 3# 2x10 ea 2# 5x10" ea 2# 10x 2# 10x 5" 2# 10x 10" ea   Supine flexion  2# 2x10 2# 10x  2# 2x10 close/far    SL shoulder abd  2# 2x10 2# 10x2 2# 2x12    SL ER  2# 2x10 2# 2x10 2# 2x12    Wall wash  4x5 ea w/ lift off (flex/scaption) 3x5 flex/scap 2x5 ea flex/scap 10x5"   Ball roll up wall  8# 4x5      IR belt stretch  10x10"      UBE 4/4 4/4 L2 4/4 np 4'/4'   4-way OH steamboats Yellow 20x ea Yellow 20x ea Yellow 20x ea     Bike 10'   5' 10'   Serratus punches     1# 20x   Shoulder press 2# 2x p!        Re-eval        Neuro Re-Ed        Wall clocks ytb 10x       Shoulder 4 way Green 2x10 ea       D2 flexion   ytb supine 2x10 YTB supine 2x5     Clamshell    10"x10    TA    Supine w/ ball press  5"x15                                                                        Ther Act            Hip hinging          10x w/ dowel    STS       14x from low mat table + TA  2x10    Modalities

## 2022-01-25 NOTE — PROGRESS NOTES
PT Re-Evaluation     Today's date: 2022  Patient name: Larissa Dodd  : 1970  MRN: [id number]  Referring provider: Cristobal Montes MD  Dx:   Encounter Diagnosis     ICD-10-CM    1  Chronic right shoulder pain  M25 511     G89 29        Start Time: 1016  Stop Time: 1050  Total time in clinic (min): 34 minutes    Assessment  Assessment details: Patient is a 45 y o  male who presents to physical therapy with c/o right shoulder pain consistent with subacromial pain syndrome  Patient presents to evaluation with pain, decreased range of motion, decreased strength, and decreased tolerance to activity  Pt was being treated for right shoulder previously but had lapse of care for 2 months while waiting on insurance form and MD follow up to resume therapy  I discussed risks, benefits, and alternatives to treatment, and answered all patient questions to patient satisfaction  Patient presents with baseline FOTO score of 57 indicating limited tolerance/ability to complete ADLs  Patient is an appropriate candidate for skilled PT and would benefit from skilled PT services to address the aforementioned impairments, achieve goals, maximize function, and improve quality of life  Pt is in agreement with this plan      Patient Education: activity modifications as needed, pacing of activities, importance of HEP compliance, PT prognosis/POC    Impairments: abnormal or restricted ROM, activity intolerance, impaired physical strength, lacks appropriate home exercise program, pain with function and poor posture   Understanding of Dx/Px/POC: good   Prognosis: good    Goals  ST weeks  Pt will demonstrate good understanding and compliance with HEP  Pt will decrease pain to 3-4/10    Pt will demonstrate improved postural awareness and ability to self-correct without reliance on external cues    LT weeks  Pt will improve FOTO score to > or = to 60 to indicate improved functional abilities   Pt will decrease pain to 1-2/10 Pt will increase shoulder strength to 4+/5 for improved tolerance/independence with ADLs  Pt will increase PROM to WNL to allow for return of AROM of affected shoulder  Pt will increase shoulder flexion/abduction to 160 degrees to increase independence with ADLs   Pt will increase functional IR to be able to don/doff belt and wash back without pain          Plan  Patient would benefit from: PT eval and skilled physical therapy  Planned modality interventions: cryotherapy and thermotherapy: hydrocollator packs  Planned therapy interventions: ADL training, manual therapy, neuromuscular re-education, strengthening, stretching, therapeutic activities, therapeutic exercise, home exercise program, graded exercise, graded activity, functional ROM exercises, flexibility, body mechanics training, patient education and postural training  Frequency: 2x week  Duration in weeks: 6  Plan of Care beginning date: 2022  Plan of Care expiration date: 3/8/2022  Treatment plan discussed with: patient        Subjective Evaluation    History of Present Illness  Mechanism of injury: Pt returns to PT with c/o right shoulder pain that started after a work incident in   Pt later sustained another injury to left shoulder at work that caused him to have surgery in May and is still undergoing therapy for this  Pt followed up with his MD recently and was offered surgery for his right shoulder but pt feels he would rather treat conservatively with PT at this point  Pt reports increased right shoulder pain/limitations with heavy lifting, reaching overhead, grabbing/pulling  Pt is right hand dominant  Pt does have some difficulty with sleeping at night due to pain  Pt denies N/T into extremities     Pain  Current pain ratin  At best pain ratin  At worst pain ratin  Quality: dull ache    Hand dominance: right    Treatments  Previous treatment: physical therapy  Patient Goals  Patient goals for therapy: decreased pain, increased motion, increased strength, independence with ADLs/IADLs, return to work and return to [employer] activities          Objective     General Comments:      Shoulder Comments   Posture: FHP, RS, increased thoracic kyphosis     Right shoulder AROM: Flex 160* (pain) Abd 125* (pain) Functional IR to T10 (pain) Functional ER to T3    Right shoulder PROM: Flex 170* Abd 170* IR 70* ER 90*    Right shoulder MMT: Flex 4-/5 (pain) Abd 4-/5 (pain) IR 5-/5 (pain) ER 4+/5 (pain)    Light touch intact and symmetrical bilateral UEs    [place]akota-Avery: (+)  Empty can: (+)    FOTO: 57             Diagnosis: R shoulder pain   Precautions: -   Primary Goals: Return to normal ADLs/work duties without pain or limitation   POC Expires: 3/8/22   Re-evaluation Date: 3/8/22   FOTO Scores/Date: Goal 60; 1/25/22 - 57   Visit Count 1/10       Manuals 1/25       R shoulder PROM all planes per tolerance  KD                                       Ther Ex                                                                                Neuro Re-Ed                                                                                                                       Ther Act                                         Modalities

## 2022-01-27 ENCOUNTER — APPOINTMENT (OUTPATIENT)
Dept: PHYSICAL THERAPY | Facility: CLINIC | Age: 52
End: 2022-01-27
Payer: OTHER MISCELLANEOUS

## 2022-01-28 ENCOUNTER — OFFICE VISIT (OUTPATIENT)
Dept: PHYSICAL THERAPY | Facility: CLINIC | Age: 52
End: 2022-01-28
Payer: OTHER MISCELLANEOUS

## 2022-01-28 DIAGNOSIS — M25.552 LEFT HIP PAIN: ICD-10-CM

## 2022-01-28 DIAGNOSIS — G89.29 CHRONIC LEFT SHOULDER PAIN: Primary | ICD-10-CM

## 2022-01-28 DIAGNOSIS — M25.511 CHRONIC RIGHT SHOULDER PAIN: Primary | ICD-10-CM

## 2022-01-28 DIAGNOSIS — M25.512 CHRONIC LEFT SHOULDER PAIN: Primary | ICD-10-CM

## 2022-01-28 DIAGNOSIS — M54.50 CHRONIC LEFT-SIDED LOW BACK PAIN, UNSPECIFIED WHETHER SCIATICA PRESENT: ICD-10-CM

## 2022-01-28 DIAGNOSIS — G89.29 CHRONIC RIGHT SHOULDER PAIN: Primary | ICD-10-CM

## 2022-01-28 DIAGNOSIS — G89.29 CHRONIC LEFT-SIDED LOW BACK PAIN, UNSPECIFIED WHETHER SCIATICA PRESENT: ICD-10-CM

## 2022-01-28 PROCEDURE — 97110 THERAPEUTIC EXERCISES: CPT

## 2022-01-28 PROCEDURE — 97140 MANUAL THERAPY 1/> REGIONS: CPT

## 2022-01-28 PROCEDURE — 97112 NEUROMUSCULAR REEDUCATION: CPT

## 2022-01-28 NOTE — PROGRESS NOTES
Daily Note     Today's date: 2022  Patient name: Flores Buitrago  : 1970  MRN: [id number]  Referring provider: Amna Mcclendon MD  Dx:   Encounter Diagnosis     ICD-10-CM    1  Chronic left shoulder pain  M25 512     G89 29    2  Chronic left-sided low back pain, unspecified whether sciatica present  M54 50     G89 29    3  Left hip pain  M25 552        Start Time: 1010          Subjective: Pt reports his back is really painful today possibly due to the exercises last session  Pt reports he had a hard time walking on Wednesday secondary to back pain  Objective: See treatment diary below      Assessment: Tolerated treatment well  Patient demonstrated fatigue post treatment and would benefit from continued PT  Improved mobility and decreased pain in shoulder post PROM  Able to tolerate supine wand exercises better post stretching  Reduced l/s psp tightness post STM  Modified session today due to pt experiencing more pain in the back and shoulder  Pt deferred using any weights today due to his back pain  Educated pt on the importance of TA contraction t/o certain activities to help reduce back pain  Plan: Continue per plan of care          Diagnosis: L shoulder, L hip, and low back pain   Precautions:    Primary Goals: Return to ADLs and work duties () without pain/limitation   POC Expires: 22   Re-evaluation Date:    FOTO Scores/Date: 21 - L shoulder 52, L hip 61, low back 37   Visit Count 610 7/10 810 910 10/10   Manuals    L shoulder PROM all planes [facility] CHELSEA KD [facility] CHELSEA JANI [facility] CHELSEA   Posterior humeral glide   Gr 1-3      STM l/s psp     JH                   Ther Ex        Supine I, Y 3# 2x10 ea 2# 5x10" ea 2# 10x 2# 10x 5" 0# 10x 5"   Supine ER     10x 5"   SL shoulder abd  2# 2x10 2# 10x2 2# 2x12    SL ER  2# 2x10 2# 2x10 2# 2x12    Wall wash  4x5 ea w/ lift off (flex/scaption) 3x5 flex/scap 2x5 ea flex/scap    Ball roll up wall  8# 4x5 IR belt stretch  10x10"      UBE 4/4 4/4 L2 4/4 np    4-way OH steamboats  Yellow 20x ea Yellow 20x ea Yellow 20x ea     Bike 10'   5' 10'   LTR     10" 10x   Piriformis stretch     30" 3x   Re-eval        Neuro Re-Ed        Wall clocks ytb 10x       Shoulder 4 way Green 2x10 ea       D2 flexion   ytb supine 2x10 YTB supine 2x5     Clamshell    10"x10    TA    Supine w/ ball press  5"x15 Supine w/ ball press  5"x20                                                                      Ther Act           Hip hinging             STS       14x from low mat table + TA    Modalities

## 2022-01-28 NOTE — PROGRESS NOTES
Daily Note     Today's date: 2022  Patient name: Kailash Metzger  : 1970  MRN: [id number]  Referring provider: Keith Castañeda MD  Dx:   Encounter Diagnosis     ICD-10-CM    1  Chronic right shoulder pain  M25 511     G89 29        Start Time: 930  Stop Time: 1010  Total time in clinic (min): 40 minutes    Subjective: Pt reports he has no pain in the R shoulder today  Objective: See treatment diary below      Assessment: Tolerated treatment well  Patient demonstrated fatigue post treatment and would benefit from continued PT  Good mobility in R shoulder overall  Minimal stretching noted w/ supine cane ER  Did not want to do supine flexion w/ wand secondary to pain in the L shoulder  Plan: Continue per plan of care           Diagnosis: R shoulder pain   Precautions: -   Primary Goals: Return to normal ADLs/work duties without pain or limitation   POC Expires: 3/8/22   Re-evaluation Date: 3/8/22   FOTO Scores/Date: Goal 60; 22 - 62   Visit Count 1/10 2/10      Manuals       R shoulder PROM all planes per tolerance  [facility] CHELSEA                                      Ther Ex        UBE  4/      Supine wand flexion close   Unable, pain in L shoulder      Wall slides flexion  10" 10x      Wall slides abduction  10" 10x      Supine ER  10" 10x                                      Neuro Re-Ed        Prone I, T, rows        Prone scap retraction        Mid rows  grn 20x      SA rows  grn 20x                                                                                     Ther Act                                         Modalities

## 2022-02-01 ENCOUNTER — EVALUATION (OUTPATIENT)
Dept: PHYSICAL THERAPY | Facility: CLINIC | Age: 52
End: 2022-02-01
Payer: OTHER MISCELLANEOUS

## 2022-02-01 ENCOUNTER — OFFICE VISIT (OUTPATIENT)
Dept: PHYSICAL THERAPY | Facility: CLINIC | Age: 52
End: 2022-02-01
Payer: OTHER MISCELLANEOUS

## 2022-02-01 DIAGNOSIS — M25.511 CHRONIC RIGHT SHOULDER PAIN: Primary | ICD-10-CM

## 2022-02-01 DIAGNOSIS — G89.29 CHRONIC RIGHT SHOULDER PAIN: Primary | ICD-10-CM

## 2022-02-01 DIAGNOSIS — G89.29 CHRONIC LEFT-SIDED LOW BACK PAIN, UNSPECIFIED WHETHER SCIATICA PRESENT: ICD-10-CM

## 2022-02-01 DIAGNOSIS — M54.50 CHRONIC LEFT-SIDED LOW BACK PAIN, UNSPECIFIED WHETHER SCIATICA PRESENT: ICD-10-CM

## 2022-02-01 DIAGNOSIS — M25.552 LEFT HIP PAIN: ICD-10-CM

## 2022-02-01 DIAGNOSIS — M25.512 CHRONIC LEFT SHOULDER PAIN: Primary | ICD-10-CM

## 2022-02-01 DIAGNOSIS — G89.29 CHRONIC LEFT SHOULDER PAIN: Primary | ICD-10-CM

## 2022-02-01 PROCEDURE — 97112 NEUROMUSCULAR REEDUCATION: CPT

## 2022-02-01 PROCEDURE — 97140 MANUAL THERAPY 1/> REGIONS: CPT

## 2022-02-01 PROCEDURE — 97110 THERAPEUTIC EXERCISES: CPT | Performed by: PHYSICAL THERAPIST

## 2022-02-01 PROCEDURE — 97140 MANUAL THERAPY 1/> REGIONS: CPT | Performed by: PHYSICAL THERAPIST

## 2022-02-01 PROCEDURE — 97110 THERAPEUTIC EXERCISES: CPT

## 2022-02-01 NOTE — LETTER
2022    Eva Mayes MD   [facility] [address]    Patient: Kishan Prado   YOB: 1970   Date of Visit: 2022     Encounter Diagnosis     ICD-10-CM    1  Chronic left shoulder pain  M25 512     G89 29    2  Chronic left-sided low back pain, unspecified whether sciatica present  M54 50     G89 29    3  Left hip pain  M25 552        Dear Dr Jennifer William:    Thank you for your recent referral of Kishan Prado  Please review the attached evaluation summary from [facility]'s recent visit  Please verify that you agree with the plan of care by signing the attached order  If you have any questions or concerns, please do not hesitate to call  I sincerely appreciate the opportunity to share in the care of one of your patients and hope to have another opportunity to work with you in the near future  Sincerely,    Dennis Sauer, PT      Referring Provider:      I certify that I have read the below Plan of Care and certify the need for these services furnished under this plan of treatment while under my care  Eva Mayes MD   [facility] [address]  Via Fax: [phone]          PT Re-Evaluation     Today's date: 2022  Patient name: Kishan Prado  : 1970  MRN: [id number]  Referring provider: Samantha Hernandez MD  Dx:   Encounter Diagnosis     ICD-10-CM    1  Chronic left shoulder pain  M25 512     G89 29    2  Chronic left-sided low back pain, unspecified whether sciatica present  M54 50     G89 29    3  Left hip pain  M25 552        Start Time: 1500  Stop Time: 1540  Total time in clinic (min): 40 minutes    Assessment  Assessment details: Patient is a 45 y o  male with left shoulder, left hip, and low back pain secondary to work injury and has completed 24 visits to date with worsened FOTO scores for L shoulder and L hip (47 shoulder, 57 hip) but improved for low back (47) since last re-assessment  Patient demonstrates slight regression of subjective/objective data since last re-assessment to include increased pain and decreased ROM  Pt is frustrated with lack of progress in regards to his left shoulder and is to follow up with his surgeon next week  Patient continues to exhibit lingering deficits to include pain, ROM, strength, and impaired activity tolerance that continues to impact ability to perform ADLs, work duties, and recreational activities  Patient will benefit from continued skilled PT intervention to address the aforementioned impairments, achieve goals, maximize function, and improve quality of life, however have agreed to place patient on hold until after MD follow up to await MD recommendations at this time as he hasn't progressed as anticipated in therapy  Will establish a new plan of care for an additional 6 weeks assuming this is recommended by MD but will continue to hold on therapy if he opts for different course of diagnosis/treatment of left shoulder due to continued pain  Pt is in agreement with plan            Impairments: abnormal or restricted ROM, activity intolerance, impaired physical strength, lacks appropriate home exercise program, pain with function, poor posture  and poor body mechanics  Understanding of Dx/Px/POC: good   Prognosis: good    Goals  ST weeks  Pt will demonstrate good understanding and compliance with HEP MET  Pt will decrease left shoulder, hip, and low back pain to 3-4/10  PROGRESSING  Pt will demonstrate improved postural awareness and ability to self-correct without reliance on external cues MET  Pt will increase PROM to WNL to allow for return of AROM of left shoulder PROGRESSING    LT weeks  Pt will improve left shoulder FOTO score to > or = to 63 to indicate improved functional abilities  PROGRESSING  Pt will improve left hip FOTO score to > or = to 65 to indicate improved functional abilities  PROGRESSING  Pt will improve low back FOTO score to > or = to 49 to indicate improved functional abilities PROGRESSING  Pt will decrease left shoulder, hip, and low back pain to 1-2/10 PROGRESSING  Pt will increase shoulder strength to 4+/5 for improved tolerance/independence with ADLs PROGRESSING  Pt will increase shoulder flexion/abduction to 150 degrees to increase independence with ADLs  PROGRESSING  Pt will increase functional IR to be able to don/doff belt and wash back without pain  PROGRESSING  Pt will increase functional ER to be able to wash hair independently without pain PROGRESSING  Pt will exhibit proper squatting/lifting mechanics without reliance on external cues for correction of form   PROGRESSING  Pt will be able to tolerate prolonged standing/sitting/walking activities > 30 minutes without provocation of low back or hip pain  PROGRESSING          Plan  Patient would benefit from: skilled physical therapy  Planned modality interventions: cryotherapy and thermotherapy: hydrocollator packs  Planned therapy interventions: home exercise program, graded exercise, graded activity, functional ROM exercises, flexibility, activity modification, ADL training, body mechanics training, manual therapy, neuromuscular re-education, postural training, self care, patient education, strengthening, stretching, therapeutic activities and therapeutic exercise  Frequency: 2x week  Duration in weeks: 6  Plan of Care beginning date: 2/1/2022  Plan of Care expiration date: 3/15/2022  Treatment plan discussed with: patient        Subjective Evaluation    History of Present Illness  Mechanism of injury: Pt states he has had an increase in back pain for past week that has been on and off but feels it is increasing in intensity and denies any injury/trauma that would explain this  Pt still having left shoulder pain/stiffness that continues to impact his daily activities  Pt feels shoulder was doing well but then started to regress with increased pain    Quality of life: good    Pain  Current pain ratin  At best pain ratin  At worst pain ratin  Quality: dull ache and throbbing    Hand dominance: right    Treatments  Current treatment: physical therapy  Patient Goals  Patient goals for therapy: decreased pain, increased motion, increased strength, return to work and independence with ADLs/IADLs          Objective     General Comments:      Lumbar Comments  Lumbar AROM: Flex moderate limitation (pain) Extension significant limitation (tight) Left SB WNL (tight) Right SB WNL (tight)    Light touch intact and symmetrical bilateral LEs  LE myotomes WNL bilaterally    FOTO: 47 (improved from 37 at last re-assessment)    Left shoulder AROM: Flex 116* (pain/stiff) Abd 85* (pain w/ compensatory trunk lean) Functional IR to T12 (pain/stiff) Functional ER to T1 (stiff, pain)    Left shoulder PROM: Flex 150* Abd 160* IR 45* ER 75* (limited secondary to pain all planes)    Left shoulder MMT deferred 2* to pain, able to resist submaximally all planes    FOTO: 47 (worsened from 52 at last re-assessment)    Left hip AROM: Flexion WNL Abduction WNL Extension 0*    Left hip MMT: Flexion 5-/5 Abduction 4-/5 Extension 4/5    Elys (+)  90/90 HS flexibility: (10*)  SLR (-)    FOTO: 57 (worsened from 61 at last re-assessment)              Diagnosis: L shoulder, L hip, and low back pain   Precautions:    Primary Goals: Return to ADLs and work duties () without pain/limitation   POC Expires: 3/15/22   Re-evaluation Date:    FOTO Scores/Date: 21 - L shoulder 52, L hip 61, low back 37; 22 - L shoulder 47, L hip 57, low back 47   Visit Count 1/10 7/10 8/10 9/10 10/10   Manuals    L shoulder PROM all planes KD KD [facility] JANI [facility]   Posterior humeral glide  KD Gr 1-3      STM l/s psp     JH                   Ther Ex        Supine I, Y  2# 5x10" ea 2# 10x 2# 10x 5" 0# 10x 5"   Supine ER     10x 5"   SL shoulder abd  2# 2x10 2# 10x2 2# 2x12    SL ER 2# 2x10 2# 2x10 2# 2x12    Wall wash  4x5 ea w/ lift off (flex/scaption) 3x5 flex/scap 2x5 ea flex/scap    Ball roll up wall  8# 4x5      IR belt stretch  10x10"      UBE  4/4 L2 4/4 np    4-way [place] steamboats  Yellow 20x ea Yellow 20x ea Yellow 20x ea     Bike    5' 10'   LTR     10" 10x   Piriformis stretch     30" 3x   Re-eval Re-assessed L shoulder, hip, low back; re-assessed FOTO for all body parts, updated PT prognosis/HEP       Neuro Re-Ed        Wall clocks        Shoulder 4 way        D2 flexion   ytb supine 2x10 YTB supine 2x5     Clamshell    10"x10    TA    Supine w/ ball press  5"x15 Supine w/ ball press  5"x20                                                                      Ther Act           Hip hinging             STS       14x from low mat table + TA    Modalities

## 2022-02-01 NOTE — PROGRESS NOTES
PT Re-Evaluation     Today's date: 2022  Patient name: Judith Reyes  : 1970  MRN: [id number]  Referring provider: Keo Greene MD  Dx:   Encounter Diagnosis     ICD-10-CM    1  Chronic left shoulder pain  M25 512     G89 29    2  Chronic left-sided low back pain, unspecified whether sciatica present  M54 50     G89 29    3  Left hip pain  M25 552        Start Time: 1500  Stop Time: 1540  Total time in clinic (min): 40 minutes    Assessment  Assessment details: Patient is a 45 y o  male with left shoulder, left hip, and low back pain secondary to work injury and has completed 24 visits to date with worsened FOTO scores for L shoulder and L hip (47 shoulder, 57 hip) but improved for low back (47) since last re-assessment  Patient demonstrates slight regression of subjective/objective data since last re-assessment to include increased pain and decreased ROM  Pt is frustrated with lack of progress in regards to his left shoulder and is to follow up with his surgeon next week  Patient continues to exhibit lingering deficits to include pain, ROM, strength, and impaired activity tolerance that continues to impact ability to perform ADLs, work duties, and recreational activities  Patient will benefit from continued skilled PT intervention to address the aforementioned impairments, achieve goals, maximize function, and improve quality of life, however have agreed to place patient on hold until after MD follow up to await MD recommendations at this time as he hasn't progressed as anticipated in therapy  Will establish a new plan of care for an additional 6 weeks assuming this is recommended by MD but will continue to hold on therapy if he opts for different course of diagnosis/treatment of left shoulder due to continued pain  Pt is in agreement with plan            Impairments: abnormal or restricted ROM, activity intolerance, impaired physical strength, lacks appropriate home exercise program, pain with function, poor posture  and poor body mechanics  Understanding of Dx/Px/POC: good   Prognosis: good    Goals  ST weeks  Pt will demonstrate good understanding and compliance with HEP MET  Pt will decrease left shoulder, hip, and low back pain to 3-4/10  PROGRESSING  Pt will demonstrate improved postural awareness and ability to self-correct without reliance on external cues MET  Pt will increase PROM to WNL to allow for return of AROM of left shoulder PROGRESSING    LT weeks  Pt will improve left shoulder FOTO score to > or = to 63 to indicate improved functional abilities  PROGRESSING  Pt will improve left hip FOTO score to > or = to 65 to indicate improved functional abilities  PROGRESSING  Pt will improve low back FOTO score to > or = to 49 to indicate improved functional abilities PROGRESSING  Pt will decrease left shoulder, hip, and low back pain to 1-210 PROGRESSING  Pt will increase shoulder strength to 4+/5 for improved tolerance/independence with ADLs PROGRESSING  Pt will increase shoulder flexion/abduction to 150 degrees to increase independence with ADLs  PROGRESSING  Pt will increase functional IR to be able to don/doff belt and wash back without pain  PROGRESSING  Pt will increase functional ER to be able to wash hair independently without pain PROGRESSING  Pt will exhibit proper squatting/lifting mechanics without reliance on external cues for correction of form   PROGRESSING  Pt will be able to tolerate prolonged standing/sitting/walking activities > 30 minutes without provocation of low back or hip pain  PROGRESSING          Plan  Patient would benefit from: skilled physical therapy  Planned modality interventions: cryotherapy and thermotherapy: hydrocollator packs  Planned therapy interventions: home exercise program, graded exercise, graded activity, functional ROM exercises, flexibility, activity modification, ADL training, body mechanics training, manual therapy, neuromuscular re-education, postural training, self care, patient education, strengthening, stretching, therapeutic activities and therapeutic exercise  Frequency: 2x week  Duration in weeks: 6  Plan of Care beginning date: 2022  Plan of Care expiration date: 3/15/2022  Treatment plan discussed with: patient        Subjective Evaluation    History of Present Illness  Mechanism of injury: Pt states he has had an increase in back pain for past week that has been on and off but feels it is increasing in intensity and denies any injury/trauma that would explain this  Pt still having left shoulder pain/stiffness that continues to impact his daily activities  Pt feels shoulder was doing well but then started to regress with increased pain    Quality of life: good    Pain  Current pain ratin  At best pain ratin  At worst pain ratin  Quality: dull ache and throbbing    Hand dominance: right    Treatments  Current treatment: physical therapy  Patient Goals  Patient goals for therapy: decreased pain, increased motion, increased strength, return to work and independence with ADLs/IADLs          Objective     General Comments:      Lumbar Comments  Lumbar AROM: Flex moderate limitation (pain) Extension significant limitation (tight) Left SB WNL (tight) Right SB WNL (tight)    Light touch intact and symmetrical bilateral LEs  LE myotomes WNL bilaterally    FOTO: 47 (improved from 37 at last re-assessment)    Left shoulder AROM: Flex 116* (pain/stiff) Abd 85* (pain w/ compensatory trunk lean) Functional IR to T12 (pain/stiff) Functional ER to T1 (stiff, pain)    Left shoulder PROM: Flex 150* Abd 160* IR 45* ER 75* (limited secondary to pain all planes)    Left shoulder MMT deferred 2* to pain, able to resist submaximally all planes    FOTO: 47 (worsened from 52 at last re-assessment)    Left hip AROM: Flexion WNL Abduction WNL Extension 0*    Left hip MMT: Flexion 5-/5 Abduction 4-/5 Extension 4/5    Elys (+)  90/90 HS flexibility: (10*)  SLR (-)    FOTO: 57 (worsened from 61 at last re-assessment)              Diagnosis: L shoulder, L hip, and low back pain   Precautions:    Primary Goals: Return to ADLs and work duties () without pain/limitation   POC Expires: 3/15/22   Re-evaluation Date:    FOTO Scores/Date: 12/21/21 - L shoulder 52, L hip 61, low back 37; 2/1/22 - L shoulder 47, L hip 57, low back 47   Visit Count 1/10 7/10 8/10 9/10 10/10   Manuals 2/1 1/13 1/18 1/25 1/28   L shoulder PROM all planes KD KD [facility] JANI [facility]SEA   Posterior humeral glide  KD Gr 1-3      STM l/s psp     JH                   Ther Ex  1/13      Supine I, Y  2# 5x10" ea 2# 10x 2# 10x 5" 0# 10x 5"   Supine ER     10x 5"   SL shoulder abd  2# 2x10 2# 10x2 2# 2x12    SL ER  2# 2x10 2# 2x10 2# 2x12    Wall wash  4x5 ea w/ lift off (flex/scaption) 3x5 flex/scap 2x5 ea flex/scap    Ball roll up wall  8# 4x5      IR belt stretch  10x10"      UBE  4/4 L2 4/4 np    4-way [place] steamboats  Yellow 20x ea Yellow 20x ea Yellow 20x ea     Bike    5' 10'   LTR     10" 10x   Piriformis stretch     30" 3x   Re-eval Re-assessed L shoulder, hip, low back; re-assessed FOTO for all body parts, updated PT prognosis/HEP       Neuro Re-Ed        Wall clocks        Shoulder 4 way        D2 flexion   ytb supine 2x10 YTB supine 2x5     Clamshell    10"x10    TA    Supine w/ ball press  5"x15 Supine w/ ball press  5"x20                                                                      Ther Act           Hip hinging             STS       14x from low mat table + TA    Modalities

## 2022-02-03 ENCOUNTER — OFFICE VISIT (OUTPATIENT)
Dept: PHYSICAL THERAPY | Facility: CLINIC | Age: 52
End: 2022-02-03
Payer: OTHER MISCELLANEOUS

## 2022-02-03 DIAGNOSIS — M25.511 CHRONIC RIGHT SHOULDER PAIN: Primary | ICD-10-CM

## 2022-02-03 DIAGNOSIS — G89.29 CHRONIC RIGHT SHOULDER PAIN: Primary | ICD-10-CM

## 2022-02-03 PROCEDURE — 97110 THERAPEUTIC EXERCISES: CPT

## 2022-02-03 PROCEDURE — 97112 NEUROMUSCULAR REEDUCATION: CPT

## 2022-02-03 PROCEDURE — 97140 MANUAL THERAPY 1/> REGIONS: CPT

## 2022-02-03 NOTE — PROGRESS NOTES
Daily Note     Today's date: 2/3/2022  Patient name: Mi Ritchie  : 1970  MRN: [id number]  Referring provider: Jose Woodall MD  Dx:   Encounter Diagnosis     ICD-10-CM    1  Chronic right shoulder pain  M25 511     G89 29        Start Time: 930  Stop Time: 101  Total time in clinic (min): 45 minutes    Subjective: pt reports he did not feel to sore post last session  Objective: See treatment diary below      Assessment: Tolerated treatment well  Patient would benefit from continued PT  C/o more shoulder discomfort w/ descending after wall slides vs raising  Mobility improving overall  Reduced pain at end ranges  Pt did well w/ outlined POC below w/ good tolerance  Plan: Continue per plan of care           Diagnosis: R shoulder pain   Precautions: -   Primary Goals: Return to normal ADLs/work duties without pain or limitation   POC Expires: 3/8/22   Re-evaluation Date: 3/8/22   FOTO Scores/Date: Goal 60; 22 - 62   Visit Count 1/10 2/10 3/10 4/10    Manuals  23    R shoulder PROM all planes per tolerance  KD FELISA [facility]                                    Ther Ex        UBE  /4 4/ 4/4    Supine wand flexion close   Unable, pain in L shoulder      Wall slides flexion  10" 10x 10" 10x W/ lift 5" 10x    Wall slides abduction  10" 10x 10" 10x W/ lift 5" 10x    Supine ER  10" 10x 10" 10x     Supine flexion   1# 10x 1# 10x    S/l ER   1# 15x 1# 10x    S/l abduction   1# 10x 1# 10x    Supine cane flexion    10" 10x    Neuro Re-Ed        Prone I, T, rows        Prone scap retraction        Mid rows  grn 20x grn 20x grn 20x    SA rows  grn 20x grn 20x grn 20x    steamboats   Yellow 10x Yellow 20x                                                                           Ther Act                                         Modalities

## 2022-02-08 ENCOUNTER — OFFICE VISIT (OUTPATIENT)
Dept: PHYSICAL THERAPY | Facility: CLINIC | Age: 52
End: 2022-02-08
Payer: OTHER MISCELLANEOUS

## 2022-02-08 DIAGNOSIS — M25.511 CHRONIC RIGHT SHOULDER PAIN: Primary | ICD-10-CM

## 2022-02-08 DIAGNOSIS — G89.29 CHRONIC RIGHT SHOULDER PAIN: Primary | ICD-10-CM

## 2022-02-08 PROCEDURE — 97112 NEUROMUSCULAR REEDUCATION: CPT

## 2022-02-08 PROCEDURE — 97110 THERAPEUTIC EXERCISES: CPT

## 2022-02-08 PROCEDURE — 97140 MANUAL THERAPY 1/> REGIONS: CPT

## 2022-02-08 NOTE — PROGRESS NOTES
Daily Note     Today's date: 2022  Patient name: Glory Rodriguez  : 1970  MRN: [id number]  Referring provider: Zaynab Shepard MD  Dx:   Encounter Diagnosis     ICD-10-CM    1  Chronic right shoulder pain  M25 511     G89 29        Start Time: 930          Subjective: Pt reports he has no major difficulties pertaining to his R shoulder over the w/k  Objective: See treatment diary below      Assessment: Tolerated treatment well  Patient would benefit from continued PT  Added rolling ball up wall w/ fair tolerance, some increased discomfort in b/l shoulders  Good posture t/o session  Plan: Continue per plan of care          Diagnosis: R shoulder pain   Precautions: -   Primary Goals: Return to normal ADLs/work duties without pain or limitation   POC Expires: 3/8/22   Re-evaluation Date: 3/8/22   FOTO Scores/Date: Goal 60; 22 - 62   Visit Count 1/10 2/10 3/10 410 5/10   Manuals  2/3 2/8   R shoulder PROM all planes per tolerance  KD [facility]                                   Ther Ex        UBE  4/4 4/4 4/4 4/4   Supine wand flexion close   Unable, pain in L shoulder      Wall slides flexion  10" 10x 10" 10x W/ lift 5" 10x W/ lift 5" 10x   Wall slides abduction  10" 10x 10" 10x W/ lift 5" 10x W/ lift 5" 10x   Supine ER  10" 10x 10" 10x     Supine flexion   1# 10x 1# 10x 2# 2x10   S/l ER   1# 15x 1# 10x 2# 2x10   S/l abduction   1# 10x 1# 10x 2# 2x10   Supine cane flexion    10" 10x Close/far  10" 10x ea   Neuro Re-Ed        Prone I, T, rows        Prone scap retraction        Mid rows  grn 20x grn 20x grn 20x    SA rows  grn 20x grn 20x grn 20x    steamboats   Yellow 10x Yellow 20x Yellow 15x   Roll ball up wall     3# 10x                                                                  Ther Act                                         Modalities

## 2022-02-10 ENCOUNTER — OFFICE VISIT (OUTPATIENT)
Dept: PHYSICAL THERAPY | Facility: CLINIC | Age: 52
End: 2022-02-10
Payer: OTHER MISCELLANEOUS

## 2022-02-10 DIAGNOSIS — G89.29 CHRONIC RIGHT SHOULDER PAIN: Primary | ICD-10-CM

## 2022-02-10 DIAGNOSIS — M25.511 CHRONIC RIGHT SHOULDER PAIN: Primary | ICD-10-CM

## 2022-02-10 PROCEDURE — 97112 NEUROMUSCULAR REEDUCATION: CPT

## 2022-02-10 PROCEDURE — 97140 MANUAL THERAPY 1/> REGIONS: CPT

## 2022-02-10 PROCEDURE — 97110 THERAPEUTIC EXERCISES: CPT

## 2022-02-10 NOTE — PROGRESS NOTES
Daily Note     Today's date: 2/10/2022  Patient name: Gloria Green  : 1970  MRN: [id number]  Referring provider: Riya Morales MD  Dx:   Encounter Diagnosis     ICD-10-CM    1  Chronic right shoulder pain  M25 511     G89 29                   Subjective: patient reports his R shoulder "its there " he does report that it is doing better since starting PT  Objective: See treatment diary below      Assessment: Staggered stance during ball roll ups decreased pain in low back; most fatigued from ball roll ups and steamboats in R shoulder  Improvement in technique with sidelying shoulder abduction with 4 sets of 5 reps and was able to maintain posterior tilt of scap  Most limited in abduction noted during PROM  Progress as able  Plan: Continue with current POC to address pt deficits          Diagnosis: R shoulder pain   Precautions: -   Primary Goals: Return to normal ADLs/work duties without pain or limitation   POC Expires: 3/8/22   Re-evaluation Date: 3/8/22   FOTO Scores/Date: Goal 60; 22 -    Visit Count 6/10 2/10 3/10 4/10 5/10   Manuals 2/10 1/28 2/1 2/3 2/8   R shoulder PROM all planes per tolerance  TB [facility]                                   Ther Ex        UBE 4 4 4 4   Supine wand flexion close   Unable, pain in L shoulder      Wall slides flexion W/lift 5" 10x  10" 10x 10" 10x W/ lift 5" 10x W/ lift 5" 10x   Wall slides abduction W/lift 5" x10 10" 10x 10" 10x W/ lift 5" 10x W/ lift 5" 10x   Supine ER  10" 10x 10" 10x     Supine flexion 2# 2x10  1# 10x 1# 10x 2# 2x10   S/l ER 2# 2x10  1# 15x 1# 10x 2# 2x10   S/l abduction 2# 4x5  1# 10x 1# 10x 2# 2x10   Supine cane flexion Close/narrow  10"x10 ea    10" 10x Close/far  10" 10x ea   Neuro Re-Ed        Prone I, T, rows        Prone scap retraction        Mid rows  grn 20x grn 20x grn 20x    SA rows  grn 20x grn 20x grn 20x    steamboats Yellow x15 ea dir   Yellow 10x Yellow 20x Yellow 15x   Roll ball up wall 3# x10    3# 10x                                                                 Ther Act                                      Modalities

## 2022-02-15 ENCOUNTER — APPOINTMENT (OUTPATIENT)
Dept: PHYSICAL THERAPY | Facility: CLINIC | Age: 52
End: 2022-02-15
Payer: OTHER MISCELLANEOUS

## 2022-02-15 NOTE — PROGRESS NOTES
Daily Note     Today's date: 2/15/2022  Patient name: Yara Tai  : 1970  MRN: [id number]  Referring provider: Desirae Ram MD  Dx: No diagnosis found  Subjective: pt reports      Objective: See treatment diary below      Assessment: Tolerated treatment well  Patient would benefit from continued PT  Plan: Continue per plan of care        Diagnosis: R shoulder pain   Precautions: -   Primary Goals: Return to normal ADLs/work duties without pain or limitation   POC Expires: 3/8/22   Re-evaluation Date: 3/8/22   FOTO Scores/Date: Goal 60; 22 - 62   Visit Count 6/10 7/10  4/10 5/10   Manuals 2/10 2/15  2/3 2/8   R shoulder PROM all planes per tolerance  [facility]                                   Ther Ex        UBE    Supine wand flexion close         Wall slides flexion W/lift 5" 10x    W/ lift 5" 10x W/ lift 5" 10x   Wall slides abduction W/lift 5" x10   W/ lift 5" 10x W/ lift 5" 10x   Supine ER        Supine flexion 2# 2x10   1# 10x 2# 2x10   S/l ER 2# 2x10   1# 10x 2# 2x10   S/l abduction 2# 4x5   1# 10x 2# 2x10   Supine cane flexion Close/narrow  10"x10 ea    10" 10x Close/far  10" 10x ea   Neuro Re-Ed        Prone I, T, rows        Prone scap retraction        Mid rows    grn 20x    SA rows    grn 20x    steamboats Yellow x15 ea dir    Yellow 20x Yellow 15x   Roll ball up wall 3# x10    3# 10x                                                               Ther Act                                Modalities

## 2022-02-17 ENCOUNTER — OFFICE VISIT (OUTPATIENT)
Dept: PHYSICAL THERAPY | Facility: CLINIC | Age: 52
End: 2022-02-17
Payer: OTHER MISCELLANEOUS

## 2022-02-17 DIAGNOSIS — M25.511 CHRONIC RIGHT SHOULDER PAIN: Primary | ICD-10-CM

## 2022-02-17 DIAGNOSIS — G89.29 CHRONIC RIGHT SHOULDER PAIN: Primary | ICD-10-CM

## 2022-02-17 PROCEDURE — 97112 NEUROMUSCULAR REEDUCATION: CPT

## 2022-02-17 PROCEDURE — 97140 MANUAL THERAPY 1/> REGIONS: CPT

## 2022-02-17 PROCEDURE — 97110 THERAPEUTIC EXERCISES: CPT

## 2022-02-17 NOTE — PROGRESS NOTES
Daily Note     Today's date: 2022  Patient name: Lori Heart  : 1970  MRN: [id number]  Referring provider: Milagros Calvin MD  Dx:   Encounter Diagnosis     ICD-10-CM    1  Chronic right shoulder pain  M25 511     G89 29        Start Time: 1413          Subjective: pt reports no new changes  Objective: See treatment diary below      Assessment: Tolerated treatment well  Patient would benefit from continued PT  Added waiters carry and ball on wall cw/ccw w/ good tolerance and no increased sxs  Plan: Continue per plan of care        Diagnosis: R shoulder pain   Precautions: -   Primary Goals: Return to normal ADLs/work duties without pain or limitation   POC Expires: 3/8/22   Re-evaluation Date: 3/8/22   FOTO Scores/Date: Goal 60; 22 - 62   Visit Count /10  7/10 4/10 5/10   Manuals 2/10  2/17 2/3 2/8   R shoulder PROM all planes per tolerance  TB  [facility]                                   Ther Ex        UBE 4/4  4/4 4/4 4/4   Supine wand flexion close         Wall slides flexion W/lift 5" 10x   W/lift 5" 10x  W/ lift 5" 10x W/ lift 5" 10x   Wall slides abduction W/lift 5" x10  W/lift 5" x10 W/ lift 5" 10x W/ lift 5" 10x   Supine ER        Supine flexion 2# 2x10  2# 2x10 1# 10x 2# 2x10   S/l ER 2# 2x10  2# 2x10 1# 10x 2# 2x10   S/l abduction 2# 4x5  2# 4x5 1# 10x 2# 2x10   Waiters carry    2# 3 laps     Supine cane flexion Close/narrow  10"x10 ea    10" 10x Close/far  10" 10x ea   Neuro Re-Ed        Prone I, T, rows        Prone scap retraction        Mid rows    grn 20x    SA rows    grn 20x    steamboats Yellow x15 ea dir    Yellow 20x Yellow 15x   Roll ball up wall 3# x10  2# cw/ccw 20x ea  3# 10x   Serratus punch supine   3" 2x10                                                         Ther Act                                Modalities

## 2022-02-22 ENCOUNTER — OFFICE VISIT (OUTPATIENT)
Dept: PHYSICAL THERAPY | Facility: CLINIC | Age: 52
End: 2022-02-22
Payer: OTHER MISCELLANEOUS

## 2022-02-22 ENCOUNTER — APPOINTMENT (OUTPATIENT)
Dept: PHYSICAL THERAPY | Facility: CLINIC | Age: 52
End: 2022-02-22
Payer: OTHER MISCELLANEOUS

## 2022-02-22 DIAGNOSIS — M25.511 CHRONIC RIGHT SHOULDER PAIN: Primary | ICD-10-CM

## 2022-02-22 DIAGNOSIS — G89.29 CHRONIC RIGHT SHOULDER PAIN: Primary | ICD-10-CM

## 2022-02-22 PROCEDURE — 97110 THERAPEUTIC EXERCISES: CPT

## 2022-02-22 PROCEDURE — 97140 MANUAL THERAPY 1/> REGIONS: CPT

## 2022-02-22 PROCEDURE — 97112 NEUROMUSCULAR REEDUCATION: CPT

## 2022-02-22 NOTE — PROGRESS NOTES
Daily Note     Today's date: 2022  Patient name: Larissa Dodd  : 1970  MRN: [id number]  Referring provider: Cristobal Montes MD  Dx:   Encounter Diagnosis     ICD-10-CM    1  Chronic right shoulder pain  M25 511     G89 29        Start Time: 925  Stop Time:   Total time in clinic (min): 47 minutes    Subjective: pt reports R shoulder is feeling a little stronger  Objective: See treatment diary below      Assessment: Tolerated treatment well  Patient would benefit from continued PT  Pt did well w/ progression of shoulder/scap stabilization  Cueing for proper technique t/o session  Pt c/o increased pain in shoulder w/ standing serratus punch but reduced towards the end  Good mobility overall  Plan: Continue per plan of care        Diagnosis: R shoulder pain   Precautions: -   Primary Goals: Return to normal ADLs/work duties without pain or limitation   POC Expires: 3/8/22   Re-evaluation Date: 3/8/22   FOTO Scores/Date: Goal 60; 22 - 62   Visit Count 6/10 7/10 7/10 4/10 5/10   Manuals 2/10 2/22 2/17 2/3 2/8   R shoulder PROM all planes per tolerance  [facility]                                   Ther Ex        UBE 4/4 4/4 4/4  4/4   Supine wand flexion close         Wall slides flexion W/lift 5" 10x  W/lift 5" 10x  W/lift 5" 10x  W/ lift 5" 10x W/ lift 5" 10x   Wall slides abduction W/lift 5" x10 W/lift 5" x10 W/lift 5" x10 W/ lift 5" 10x W/ lift 5" 10x   Supine ER        Supine flexion 2# 2x10  2# 2x10 1# 10x 2# 2x10   S/l ER 2# 2x10  2# 2x10 1# 10x 2# 2x10   S/l abduction 2# 4x5  2# 4x5 1# 10x 2# 2x10   Waiters carry    2# 3 laps     Supine cane flexion Close/narrow  10"x10 ea    10" 10x Close/far  10" 10x ea   Neuro Re-Ed        Prone I, T, rows        Prone scap retraction        Mid rows  Orange 20x  grn 20x    SA rows  grn 20x  grn 20x    steamboats Yellow x15 ea dir  grn 20x ea dir  Yellow 20x Yellow 15x   Roll ball up wall 3# x10 3# 10x 2# cw/ccw 20x ea  3# 10x   Serratus punch supine  Standing into ball on wall 20x 3" 2x10     Ball on wall  2# cw/ccw 20x                                                  Ther Act                                Modalities

## 2022-02-24 ENCOUNTER — OFFICE VISIT (OUTPATIENT)
Dept: PHYSICAL THERAPY | Facility: CLINIC | Age: 52
End: 2022-02-24
Payer: OTHER MISCELLANEOUS

## 2022-02-24 DIAGNOSIS — G89.29 CHRONIC RIGHT SHOULDER PAIN: Primary | ICD-10-CM

## 2022-02-24 DIAGNOSIS — M25.511 CHRONIC RIGHT SHOULDER PAIN: Primary | ICD-10-CM

## 2022-02-24 PROCEDURE — 97140 MANUAL THERAPY 1/> REGIONS: CPT

## 2022-02-24 PROCEDURE — 97112 NEUROMUSCULAR REEDUCATION: CPT

## 2022-02-24 PROCEDURE — 97110 THERAPEUTIC EXERCISES: CPT

## 2022-02-24 NOTE — PROGRESS NOTES
Daily Note     Today's date: 2022  Patient name: Kailash Metzger  : 1970  MRN: [id number]  Referring provider: Keith Castañeda MD  Dx:   Encounter Diagnosis     ICD-10-CM    1  Chronic right shoulder pain  M25 511     G89 29        Start Time: 930          Subjective: pt reports his R shoulder is doing okay today, no new complaints  Objective: See treatment diary below      Assessment: Tolerated treatment well  Patient would benefit from continued PT  Pt had difficulty w/ standing exercises and required multiple rest breaks  Cueing for correct scapular positioning w/ s/l abduction  Good mobility overall  Pt c/o pain in bicep w/ s/l abduction  Plan: Continue per plan of care        Diagnosis: R shoulder pain   Precautions: -   Primary Goals: Return to normal ADLs/work duties without pain or limitation   POC Expires: 3/8/22   Re-evaluation Date: 3/8/22   FOTO Scores/Date: Goal 60; 22 - 62   Visit Count 6/10 7/10 8/10 4/10 510   Manuals 2/10 2/22 2/24 2/3 2/8   R shoulder PROM all planes per tolerance  [facility]                                   Ther Ex        UBE /4 4/4 4/4 4 4/4   Supine wand flexion close         Wall slides flexion W/lift 5" 10x  W/lift 5" 10x  W/lift 5" 15x W/ lift 5" 10x W/ lift 5" 10x   Wall slides abduction W/lift 5" x10 W/lift 5" x10 W/lift 5" 15x W/ lift 5" 10x W/ lift 5" 10x   Supine ER        Supine flexion 2# 2x10  2# 2x10 1# 10x 2# 2x10   S/l ER 2# 2x10  2# 2x10 1# 10x 2# 2x10   S/l abduction 2# 4x5  2# 2x10 1# 10x 2# 2x10   Waiters carry         Supine cane flexion Close/narrow  10"x10 ea    10" 10x Close/far  10" 10x ea   Neuro Re-Ed        Prone I, T, rows        Prone scap retraction        Mid rows  Orange 20x  grn 20x    SA rows  grn 20x  grn 20x    steamboats Yellow x15 ea dir  grn 20x ea dir  Yellow 20x Yellow 15x   Roll ball up wall 3# x10 3# 10x 5# 10x  3# 10x   Serratus punch supine  Standing into ball on wall 20x Supine 2# 2x10     Ball on wall  2# cw/ccw 20x 2# cw/ccw 20x      Wall clocks        D2 flexion supine   ytb 2x10                                 Ther Act                                Modalities

## 2022-03-03 ENCOUNTER — OFFICE VISIT (OUTPATIENT)
Dept: PHYSICAL THERAPY | Facility: CLINIC | Age: 52
End: 2022-03-03
Payer: OTHER MISCELLANEOUS

## 2022-03-03 DIAGNOSIS — M25.511 CHRONIC RIGHT SHOULDER PAIN: Primary | ICD-10-CM

## 2022-03-03 DIAGNOSIS — G89.29 CHRONIC RIGHT SHOULDER PAIN: Primary | ICD-10-CM

## 2022-03-03 PROCEDURE — 97140 MANUAL THERAPY 1/> REGIONS: CPT

## 2022-03-03 PROCEDURE — 97112 NEUROMUSCULAR REEDUCATION: CPT

## 2022-03-03 PROCEDURE — 97110 THERAPEUTIC EXERCISES: CPT

## 2022-03-06 ENCOUNTER — HOSPITAL ENCOUNTER (EMERGENCY)
Facility: HOSPITAL | Age: 52
Discharge: HOME | End: 2022-03-06
Attending: EMERGENCY MEDICINE | Admitting: EMERGENCY MEDICINE
Payer: OTHER MISCELLANEOUS

## 2022-03-06 VITALS
OXYGEN SATURATION: 98 % | DIASTOLIC BLOOD PRESSURE: 87 MMHG | WEIGHT: 190 LBS | RESPIRATION RATE: 18 BRPM | HEART RATE: 85 BPM | SYSTOLIC BLOOD PRESSURE: 178 MMHG | TEMPERATURE: 98.8 F | BODY MASS INDEX: 28.79 KG/M2 | HEIGHT: 68 IN

## 2022-03-06 DIAGNOSIS — M54.42 ACUTE LEFT-SIDED LOW BACK PAIN WITH LEFT-SIDED SCIATICA: Primary | ICD-10-CM

## 2022-03-06 PROCEDURE — 99282 EMERGENCY DEPT VISIT SF MDM: CPT

## 2022-03-06 PROCEDURE — 99284 EMERGENCY DEPT VISIT MOD MDM: CPT | Performed by: EMERGENCY MEDICINE

## 2022-03-06 RX ORDER — OXYCODONE HYDROCHLORIDE AND ACETAMINOPHEN 5; 325 MG/1; MG/1
1 TABLET ORAL EVERY 6 HOURS PRN
Qty: 12 TABLET | Refills: 0 | Status: SHIPPED | OUTPATIENT
Start: 2022-03-06

## 2022-03-06 RX ORDER — PREDNISONE 20 MG/1
40 TABLET ORAL DAILY
Qty: 10 TABLET | Refills: 0 | Status: SHIPPED | OUTPATIENT
Start: 2022-03-06

## 2022-03-06 NOTE — ED PROVIDER NOTES
History  Chief Complaint   Patient presents with    Back Pain     lower back pain with L hip pain and numbness down left leg' states that he tried to get up, had pain and couldnt stay standing; hx of bulging discs      44 yo male out of work due to [employer] back injury who presents to ED for L lower back pain and numbness radiating posteriorly down L thigh since earlier today  He states he has been having increased pain for the last 3 weeks which started at PT  The numbness is new as of today  There is no fever, incontinence, or weakness  Pain moderate, worsened with sitting up an ambulating and alleviated by lying down  No h/o ivda  No recent surgery or injection of spine or back  No leg weakness  Additional history from wife at bedside  Prior to Admission Medications   Prescriptions Last Dose Informant Patient Reported? Taking?   naproxen (NAPROSYN) 500 mg tablet   No No   Sig: Take 1 tablet (500 mg total) by mouth 2 (two) times a day with meals      Facility-Administered Medications: None       Past Medical History:   Diagnosis Date    High cholesterol     Hypertension        No past surgical history on file  No family history on file  I have reviewed and agree with the history as documented  E-Cigarette/Vaping     E-Cigarette/Vaping Substances     Social History     Tobacco Use    Smoking status: Never Smoker    Smokeless tobacco: Never Used   Substance Use Topics    Alcohol use: Never    Drug use: Not on file       Review of Systems   Musculoskeletal: Positive for back pain  Neurological: Positive for numbness  All other systems reviewed and are negative  Physical Exam  Physical Exam  Vitals and nursing note reviewed  Constitutional:       General: He is not in acute distress  Appearance: He is well-developed  He is not ill-appearing, toxic-appearing or diaphoretic  HENT:      Head: Normocephalic and atraumatic     Eyes:      General:         Right eye: No discharge  Left eye: No discharge  Conjunctiva/sclera: Conjunctivae normal       Pupils: Pupils are equal, round, and reactive to light  Neck:      Vascular: No JVD  Pulmonary:      Breath sounds: No stridor  Musculoskeletal:         General: No swelling, tenderness, deformity or signs of injury  Normal range of motion  Cervical back: Normal range of motion and neck supple  Right lower leg: No edema  Left lower leg: No edema  Skin:     General: Skin is warm and dry  Capillary Refill: Capillary refill takes less than 2 seconds  Coloration: Skin is not jaundiced or pale  Findings: No bruising, erythema, lesion or rash  Neurological:      General: No focal deficit present  Mental Status: He is alert and oriented to person, place, and time  Cranial Nerves: No cranial nerve deficit  Sensory: No sensory deficit  Motor: No weakness or abnormal muscle tone  Coordination: Coordination normal       Gait: Gait normal       Comments: Normal strength and reflexes b/l LEs  Vital Signs  ED Triage Vitals [03/06/22 1241]   Temperature Pulse Respirations Blood Pressure SpO2   98 8 °F (37 1 °C) 85 18 (!) 178/87 98 %      Temp Source Heart Rate Source Patient Position - Orthostatic VS BP Location FiO2 (%)   Temporal Monitor Sitting Left arm --      Pain Score       --           Vitals:    03/06/22 1241   BP: (!) 178/87   Pulse: 85   Patient Position - Orthostatic VS: Sitting         Visual Acuity      ED Medications  Medications - No data to display    Diagnostic Studies  Results Reviewed     None                 No orders to display              Procedures  Procedures         ED Course                                             MDM  Number of Diagnoses or Management Options  Acute left-sided low back pain with left-sided sciatica  Diagnosis management comments: Acute on chronic back pain without red flags  Will put on steroids and analgesics   Has comp doctor in [place] with whom he will f/u  Emergent imaging not indicated  Disposition  Final diagnoses:   Acute left-sided low back pain with left-sided sciatica     Time reflects when diagnosis was documented in both MDM as applicable and the Disposition within this note     Time User Action Codes Description Comment    3/6/2022  1:34 PM Elpidio Meagan Add [M54 42] Acute left-sided low back pain with left-sided sciatica       ED Disposition     ED Disposition Condition Date/Time Comment    Discharge Stable Sun Mar 6, 2022  1:34 PM Khoa Majano discharge to home/self care  Follow-up Information    None         Patient's Medications   Discharge Prescriptions    OXYCODONE-ACETAMINOPHEN (PERCOCET) 5-325 MG PER TABLET    Take 1 tablet by mouth every 6 (six) hours as needed for moderate pain for up to 12 doses Max Daily Amount: 4 tablets       Start Date: 3/6/2022  End Date: --       Order Dose: 1 tablet       Quantity: 12 tablet    Refills: 0    PREDNISONE 20 MG TABLET    Take 2 tablets (40 mg total) by mouth daily       Start Date: 3/6/2022  End Date: --       Order Dose: 40 mg       Quantity: 10 tablet    Refills: 0       No discharge procedures on file      PDMP Review     None          ED Provider  Electronically Signed by           Teresa Holder MD  03/06/22 [phone]

## 2022-04-19 NOTE — PROGRESS NOTES
Pt to be discharged from PT due to non-compliance  Please refer to most recent note for objective/subjective measures

## 2023-02-03 NOTE — PROGRESS NOTES
PT Evaluation     Today's date: 6/3/2021  Patient name: Glory Rodriguez  : 1970  MRN: [id number]  Referring provider: Zaynab Shepard MD  Dx:   Encounter Diagnosis     ICD-10-CM    1  Chronic left shoulder pain  M25 512     G89 29        Start Time: 1415  Stop Time: 1500  Total time in clinic (min): 45 minutes    Assessment  Assessment details: Patient presents with chronic L shoulder pain, secondary to injury at work ; demonstrates FHRS, (+) BL UT myofascial tightness, decreased Rom and Strength, reports difficulty sleeping, lifting; patient would benefit from therapeutic exercise, manual therapy, patient education regarding posture and proper lifting techniques; patient issued HEP  Impairments: abnormal muscle tone, abnormal or restricted ROM, impaired physical strength and pain with function  Understanding of Dx/Px/POC: good   Prognosis: good    Goals  STGs  1  Decrease pain 50% in 2 weeks  2  Increase Rom 50% in 2 weeks  3  Increase Strength half grade in 2 weeks  4  Compliant with HEP in 2 weeks  LTGs  1  Decrease pain to mild to none in 4 weeks  2  Increase Rom WNL in 4 weeks  3  Increase Strength WNL in 4 weeks  4  Able to sleep with mild to no difficulty in 4 weeks  5   Able to lift with mild to no difficulty in 4 weeks    Plan  Patient would benefit from: skilled physical therapy  Planned modality interventions: hydrotherapy  Planned therapy interventions: joint mobilization, manual therapy, neuromuscular re-education, postural training, therapeutic activities, therapeutic exercise, home exercise program and functional ROM exercises  Frequency: 2x week  Duration in weeks: 4        Subjective Evaluation    History of Present Illness  Mechanism of injury: Chronic, secondary to injury at work           Not a recurrent problem   Quality of life: good    Pain  Current pain ratin  At best pain ratin  At worst pain ratin  Quality: throbbing  Relieving factors: relaxation  Aggravating factors: lifting    Social Support  Lives with: spouse and young children    Employment status: not working  Hand dominance: right      Diagnostic Tests  No diagnostic tests performed  Treatments  Previous treatment: physical therapy  Patient Goals  Patient goals for therapy: increased strength, decreased pain and increased motion  Patient goal: lift, sleep        Objective     Postural Observations    Additional Postural Observation Details  FHRS    Palpation   Left   Hypertonic in the upper trapezius  Right   Hypertonic in the upper trapezius       Active Range of Motion   Left Shoulder   Flexion: 35 degrees   Extension: 38 degrees   Abduction: 50 degrees   External rotation 0°: 40 degrees   Internal rotation BTB: sacrum     Strength/Myotome Testing     Left Shoulder     Planes of Motion   Flexion: 3-   Extension: 4-   Abduction: 3-   External rotation at 0°: 4-   Internal rotation at 0°: 4-              Precautions: none      Manual 6/3            MFR             Jt Mobs             vib/mass                          Neuro Re-Ed             Prone retract             Prone abd             Prone ext             [place] [address]                                                    Ther Ex             ube             pulleys             Doorway stretch             TB - all planes             grippers             Pt ed - HEP 5            Prom 10                         Ther Activity             lift                          Gait Training                                       Modalities              No

## 2023-05-11 ENCOUNTER — HOSPITAL ENCOUNTER (EMERGENCY)
Facility: HOSPITAL | Age: 53
Discharge: HOME | End: 2023-05-11
Attending: EMERGENCY MEDICINE

## 2023-05-11 ENCOUNTER — TELEPHONE (OUTPATIENT)
Dept: PHYSICAL THERAPY | Facility: OTHER | Age: 53
End: 2023-05-11

## 2023-05-11 ENCOUNTER — APPOINTMENT (EMERGENCY)
Dept: RADIOLOGY | Facility: HOSPITAL | Age: 53
End: 2023-05-11

## 2023-05-11 VITALS
HEART RATE: 62 BPM | OXYGEN SATURATION: 99 % | TEMPERATURE: 97.9 F | DIASTOLIC BLOOD PRESSURE: 89 MMHG | RESPIRATION RATE: 16 BRPM | SYSTOLIC BLOOD PRESSURE: 163 MMHG
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
## 2023-05-11 DIAGNOSIS — M54.9 BACK PAIN: ICD-10-CM

## 2023-05-11 RX ORDER — NAPROXEN 250 MG/1
500 TABLET ORAL ONCE
Status: COMPLETED | OUTPATIENT
Start: 2023-05-11 | End: 2023-05-11

## 2023-05-11 RX ORDER — METHOCARBAMOL 750 MG/1
750 TABLET, FILM COATED ORAL EVERY 6 HOURS PRN
Qty: 20 TABLET | Refills: 0 | Status: SHIPPED | OUTPATIENT
Start: 2023-05-11 | End: 2023-05-23 | Stop reason: ALTCHOICE

## 2023-05-11 RX ORDER — METHOCARBAMOL 500 MG/1
1000 TABLET, FILM COATED ORAL ONCE
Status: COMPLETED | OUTPATIENT
Start: 2023-05-11 | End: 2023-05-11

## 2023-05-11 RX ORDER — NAPROXEN 500 MG/1
500 TABLET ORAL 2 TIMES DAILY WITH MEALS
Qty: 20 TABLET | Refills: 0 | Status: SHIPPED | OUTPATIENT
Start: 2023-05-11

## 2023-05-11 RX ORDER — ACETAMINOPHEN 325 MG/1
975 TABLET ORAL ONCE
Status: COMPLETED | OUTPATIENT
Start: 2023-05-11 | End: 2023-05-11

## 2023-05-11 RX ORDER — LIDOCAINE 50 MG/G
1 PATCH TOPICAL ONCE
Status: DISCONTINUED | OUTPATIENT
Start: 2023-05-11 | End: 2023-05-11 | Stop reason: HOSPADM

## 2023-05-11 RX ADMIN — METHOCARBAMOL 1000 MG: 500 TABLET ORAL at 10:05

## 2023-05-11 RX ADMIN — NAPROXEN 500 MG: 250 TABLET ORAL at 10:05

## 2023-05-11 RX ADMIN — ACETAMINOPHEN 975 MG: 325 TABLET ORAL at 10:05

## 2023-05-11 RX ADMIN — LIDOCAINE 5% 1 PATCH: 700 PATCH TOPICAL at 09:58

## 2023-05-11 NOTE — ED NOTES
Patient transported to [facility] [address] [place] [address] Lam Begum RN  05/11/23 3090
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
## 2023-05-11 NOTE — Clinical Note
Edie Araujo was seen and treated in our emergency department on 5/11/2023  Diagnosis:     Elpidio Rocha  may return to work on return date  He may return on this date: 05/15/2023         If you have any questions or concerns, please don't hesitate to call        Freeman Diallo MD    ______________________________           _______________          _______________  Hospital Representative                              Date                                Time

## 2023-05-18 ENCOUNTER — TELEPHONE (OUTPATIENT)
Dept: PAIN MEDICINE | Facility: CLINIC | Age: 53
End: 2023-05-18
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
## 2023-05-23 ENCOUNTER — CONSULT (OUTPATIENT)
Dept: PAIN MEDICINE | Facility: CLINIC | Age: 53
End: 2023-05-23

## 2023-05-23 ENCOUNTER — TELEPHONE (OUTPATIENT)
Dept: PAIN MEDICINE | Facility: CLINIC | Age: 53
End: 2023-05-23

## 2023-05-23 VITALS
WEIGHT: 189.8 LBS | DIASTOLIC BLOOD PRESSURE: 89 MMHG | BODY MASS INDEX: 28.86 KG/M2 | SYSTOLIC BLOOD PRESSURE: 173 MMHG | HEART RATE: 76 BPM

## 2023-05-23 DIAGNOSIS — M79.18 MYOFASCIAL PAIN SYNDROME: ICD-10-CM

## 2023-05-23 DIAGNOSIS — M54.2 NECK PAIN: Primary | ICD-10-CM

## 2023-05-23 DIAGNOSIS — M54.50 ACUTE LEFT-SIDED LOW BACK PAIN WITHOUT SCIATICA: ICD-10-CM

## 2023-05-23 DIAGNOSIS — M54.6 ACUTE BILATERAL THORACIC BACK PAIN: ICD-10-CM

## 2023-05-23 RX ORDER — SIMVASTATIN 10 MG
TABLET ORAL
COMMUNITY
Start: 2023-03-29

## 2023-05-23 RX ORDER — TIZANIDINE 4 MG/1
4 TABLET ORAL 3 TIMES DAILY PRN
Qty: 90 TABLET | Refills: 0 | Status: SHIPPED | OUTPATIENT
Start: 2023-05-23

## 2023-05-23 NOTE — TELEPHONE ENCOUNTER
Gregg Dawson MD  [facility]  Please schedule TPI cervical/thoracic/lumbar for this patient. [id number] and [id number]     Sw pt and scheduled TPI for 5/24/23 1030 am. Advised if sick will need to reschedule and avoid vaccines 2 weeks post injection. Progressive mojgan w/[facility] clm open.  No auth req. 5/23/23 328 pm  [employer] [facility] mojgan thru [facility], no auth req. 5/23/23 335 pm

## 2023-05-23 NOTE — PATIENT INSTRUCTIONS
Trigger Point Injection   WHAT YOU NEED TO KNOW:   What do I need to know about a trigger point injection? A trigger point injection is used to relax a muscle knot  This helps relieve pain  You may be able to have more than one trigger point treated during a session  How do I prepare for a trigger point injection? Your healthcare provider will tell you how to prepare  Arrange to have someone drive you home after the injection  Tell your provider about all medicines you take, including pain medicine, blood thinners, and muscle relaxers  He or she will tell you if you need to stop any medicine for the injection, and when to stop  He or she will tell you which medicines to take or not take on the day of the injection  Tell your provider about all your allergies, including to any pain medicine  What will happen during a trigger point injection? You may be sitting or lying, depending on where the trigger point is located  Your healthcare provider will feel for a knot in the muscle  He or she may cindy your skin over the knot  Your provider will put a needle through your skin and into the trigger point  Saline (salt solution), pain relievers, or other medicines may be pushed through the needle into the trigger point  Your provider may use only a dry needle (no medicine)  He or she will pull the needle almost all the way out and then push it in again  He or she will repeat this several times until the muscle stops twitching or feels relaxed  Your provider will remove the needle and stretch the muscle area  He or she may apply pressure to the area for 2 minutes  A bandage will be put over the injection site to prevent bleeding or an infection  What should I expect after a trigger point injection? You may feel pain relief right away  It is normal for some pain to start again 2 hours later  An ice pack or over-the-counter pain medicine can help lower the pain      You may feel sore in the injection site for a few days  If you need another injection in the same area, wait until the area is not sore  Your healthcare provider may give you specific activity instructions to follow at home or recommend physical therapy  In general, you should try to stay active  Avoid strenuous activity for the first 3 or 4 days after the injection  Do not have more injections if you still have trigger point pain after 2 or 3 injections  What are the risks of a trigger point injection? You may have a severe allergic reaction to pain medicine injected  The injection may be painful, or you may be sore where you got the injection  You may bleed, bruise, or develop an infection in the injection area  The injection may cause you to feel faint  Rarely, the needle may cause muscle or blood vessel damage or your lung may collapse if you get the injection near your chest   CARE AGREEMENT:   You have the right to help plan your care  Learn about your health condition and how it may be treated  Discuss treatment options with your healthcare providers to decide what care you want to receive  You always have the right to refuse treatment  The above information is an  only  It is not intended as medical advice for individual conditions or treatments  Talk to your doctor, nurse or pharmacist before following any medical regimen to see if it is safe and effective for you  © Copyright [employer] 2022 Information is for End User's use only and may not be sold, redistributed or otherwise used for commercial purposes

## 2023-05-24 ENCOUNTER — PROCEDURE VISIT (OUTPATIENT)
Dept: PAIN MEDICINE | Facility: CLINIC | Age: 53
End: 2023-05-24

## 2023-05-24 VITALS
HEART RATE: 68 BPM | BODY MASS INDEX: 28.86 KG/M2 | WEIGHT: 189.8 LBS | DIASTOLIC BLOOD PRESSURE: 95 MMHG | SYSTOLIC BLOOD PRESSURE: 175 MMHG

## 2023-05-24 DIAGNOSIS — M79.18 MYOFASCIAL PAIN SYNDROME: Primary | ICD-10-CM

## 2023-05-24 RX ORDER — SIMVASTATIN 10 MG
10 TABLET ORAL
COMMUNITY
Start: 2023-03-29

## 2023-05-24 RX ORDER — METHYLPREDNISOLONE ACETATE 40 MG/ML
40 INJECTION, SUSPENSION INTRA-ARTICULAR; INTRALESIONAL; INTRAMUSCULAR; SOFT TISSUE ONCE
Status: COMPLETED | OUTPATIENT
Start: 2023-05-24 | End: 2023-05-24

## 2023-05-24 RX ORDER — TELMISARTAN 40 MG/1
40 TABLET ORAL DAILY
COMMUNITY
Start: 2023-03-29

## 2023-05-24 RX ORDER — BUPIVACAINE HYDROCHLORIDE 2.5 MG/ML
6 INJECTION, SOLUTION EPIDURAL; INFILTRATION; INTRACAUDAL ONCE
Status: COMPLETED | OUTPATIENT
Start: 2023-05-24 | End: 2023-05-24

## 2023-05-24 RX ADMIN — METHYLPREDNISOLONE ACETATE 40 MG: 40 INJECTION, SUSPENSION INTRA-ARTICULAR; INTRALESIONAL; INTRAMUSCULAR; SOFT TISSUE at 10:58

## 2023-05-24 RX ADMIN — BUPIVACAINE HYDROCHLORIDE 6 ML: 2.5 INJECTION, SOLUTION EPIDURAL; INFILTRATION; INTRACAUDAL at 10:58

## 2023-05-24 NOTE — PROGRESS NOTES
Universal Protocol:  Consent: Written consent obtained  Risks and benefits: risks, benefits and alternatives were discussed  Consent given by: patient  Patient understanding: patient states understanding of the procedure being performed  Patient consent: the patient's understanding of the procedure matches consent given  Procedure consent: procedure consent matches procedure scheduled  Relevant documents: relevant documents present and verified  Site marked: the operative site was marked  Patient identity confirmed: verbally with patient    Supporting Documentation  Indications: pain    Injection site identified by: palpation    Procedure Details  Location(s):  Needle size: 25 G  Patient tolerance: patient tolerated the procedure well with no immediate complications  Additional procedure details: ATTENDING PHYSICIAN:  Karlee Dunn MD     PROCEDURE:  Trigger point injections x1 to the right cervical paraspinal, x1 to the left cervical paraspinal, x2 to the left lumbar paraspinal, and x3 to the left upper trapezius musculature with local anesthetic and steroid  PRE-PROCEDURE DIAGNOSIS:  Myofascial pain syndrome  POST-PROCEDURE DIAGNOSIS:  Myofascial pain syndrome  ESTIMATED BLOOD LOSS:  Minimal     ANESTHESIA:  None  COMPLICATIONS:  None  CONSENT:  Today's procedure, its potential benefits as well as its risks and side effects were reviewed  Discussed risks of the procedure including bleeding, infection, reactions to the medications, failure the pain to improve, and potential worsening of the pain as well as pneumothorax were explained in detail to the patient, who verbalized understanding and wished to proceed  Written informed consent was thereby obtained  DESCRIPTION OF THE PROCEDURE:  After written informed consent was obtained, the patient was placed in the sitting position on the examination table  Anatomical landmarks were identified via palpation   The trigger points were identified and marked after palpation  The skin overlying these 7 marked trigger points was prepared using Betadine swabs x3 in the usual sterile fashion  Strict aseptic technique was utilized throughout the procedure  A 7 mL injectate consisting of 6 mL of 1% lidocaine mixed with 1 mL of Depo-Medrol 40 mg/mL was drawn up sterilely  Using a 25-gauge 1 25 inch needle, 1 mL of the above injectate was administered to each of the marked trigger points following negative aspiration  The patient tolerated the procedure well and all needles were removed with the tips intact  Hemostasis was maintained  There were no apparent complications  The skin was wiped clean and Band-Aids were placed as appropriate  The patient was monitored for an appropriate period of time following the procedure and remained hemodynamically stable and neurovascularly intact following the procedure as she was prior to the procedure  The patient was ultimately discharged to home with supervision in good condition  I was present for and participated in all key and critical portions of this procedure

## 2023-06-09 ENCOUNTER — EVALUATION (OUTPATIENT)
Dept: PHYSICAL THERAPY | Facility: CLINIC | Age: 53
End: 2023-06-09
Payer: COMMERCIAL

## 2023-06-09 DIAGNOSIS — M79.18 MYOFASCIAL PAIN SYNDROME: ICD-10-CM

## 2023-06-09 DIAGNOSIS — M54.2 NECK PAIN: Primary | ICD-10-CM

## 2023-06-09 DIAGNOSIS — M54.50 ACUTE LEFT-SIDED LOW BACK PAIN WITHOUT SCIATICA: ICD-10-CM

## 2023-06-09 DIAGNOSIS — M54.6 ACUTE BILATERAL THORACIC BACK PAIN: ICD-10-CM

## 2023-06-09 PROCEDURE — 97161 PT EVAL LOW COMPLEX 20 MIN: CPT

## 2023-06-09 PROCEDURE — 97112 NEUROMUSCULAR REEDUCATION: CPT
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
## 2023-06-19 ENCOUNTER — OFFICE VISIT (OUTPATIENT)
Dept: PHYSICAL THERAPY | Facility: CLINIC | Age: 53
End: 2023-06-19
Payer: COMMERCIAL

## 2023-06-19 DIAGNOSIS — M79.18 MYOFASCIAL PAIN SYNDROME: ICD-10-CM

## 2023-06-19 DIAGNOSIS — M54.50 ACUTE LEFT-SIDED LOW BACK PAIN WITHOUT SCIATICA: ICD-10-CM

## 2023-06-19 DIAGNOSIS — M54.2 NECK PAIN: Primary | ICD-10-CM

## 2023-06-19 DIAGNOSIS — M54.6 ACUTE BILATERAL THORACIC BACK PAIN: ICD-10-CM

## 2023-06-19 PROCEDURE — 97112 NEUROMUSCULAR REEDUCATION: CPT

## 2023-06-19 PROCEDURE — 97110 THERAPEUTIC EXERCISES: CPT
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
## 2023-06-22 ENCOUNTER — OFFICE VISIT (OUTPATIENT)
Dept: PHYSICAL THERAPY | Facility: CLINIC | Age: 53
End: 2023-06-22
Payer: COMMERCIAL

## 2023-06-22 DIAGNOSIS — M54.2 NECK PAIN: Primary | ICD-10-CM

## 2023-06-22 DIAGNOSIS — M79.18 MYOFASCIAL PAIN SYNDROME: ICD-10-CM

## 2023-06-22 DIAGNOSIS — M54.50 ACUTE LEFT-SIDED LOW BACK PAIN WITHOUT SCIATICA: ICD-10-CM

## 2023-06-22 DIAGNOSIS — M54.6 ACUTE BILATERAL THORACIC BACK PAIN: ICD-10-CM

## 2023-06-22 PROCEDURE — 97110 THERAPEUTIC EXERCISES: CPT

## 2023-06-22 PROCEDURE — 97112 NEUROMUSCULAR REEDUCATION: CPT
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
## 2023-06-26 ENCOUNTER — OFFICE VISIT (OUTPATIENT)
Dept: PHYSICAL THERAPY | Facility: CLINIC | Age: 53
End: 2023-06-26
Payer: COMMERCIAL

## 2023-06-26 DIAGNOSIS — M54.2 NECK PAIN: Primary | ICD-10-CM

## 2023-06-26 DIAGNOSIS — M54.6 ACUTE BILATERAL THORACIC BACK PAIN: ICD-10-CM

## 2023-06-26 DIAGNOSIS — M54.50 ACUTE LEFT-SIDED LOW BACK PAIN WITHOUT SCIATICA: ICD-10-CM

## 2023-06-26 DIAGNOSIS — M79.18 MYOFASCIAL PAIN SYNDROME: ICD-10-CM

## 2023-06-26 PROCEDURE — 97110 THERAPEUTIC EXERCISES: CPT

## 2023-06-26 PROCEDURE — 97112 NEUROMUSCULAR REEDUCATION: CPT
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
## 2023-06-29 ENCOUNTER — APPOINTMENT (OUTPATIENT)
Dept: PHYSICAL THERAPY | Facility: CLINIC | Age: 53
End: 2023-06-29
Payer: COMMERCIAL

## 2023-07-03 ENCOUNTER — OFFICE VISIT (OUTPATIENT)
Dept: PHYSICAL THERAPY | Facility: CLINIC | Age: 53
End: 2023-07-03
Payer: COMMERCIAL

## 2023-07-03 DIAGNOSIS — M54.6 ACUTE BILATERAL THORACIC BACK PAIN: Primary | ICD-10-CM

## 2023-07-03 DIAGNOSIS — M54.2 NECK PAIN: ICD-10-CM

## 2023-07-03 DIAGNOSIS — M54.50 ACUTE LEFT-SIDED LOW BACK PAIN WITHOUT SCIATICA: ICD-10-CM

## 2023-07-03 DIAGNOSIS — M79.18 MYOFASCIAL PAIN SYNDROME: ICD-10-CM

## 2023-07-03 PROCEDURE — 97110 THERAPEUTIC EXERCISES: CPT

## 2023-07-03 PROCEDURE — 97112 NEUROMUSCULAR REEDUCATION: CPT
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
## 2023-07-06 ENCOUNTER — OFFICE VISIT (OUTPATIENT)
Dept: PHYSICAL THERAPY | Facility: CLINIC | Age: 53
End: 2023-07-06
Payer: COMMERCIAL

## 2023-07-06 DIAGNOSIS — M54.50 ACUTE LEFT-SIDED LOW BACK PAIN WITHOUT SCIATICA: ICD-10-CM

## 2023-07-06 DIAGNOSIS — M54.2 NECK PAIN: ICD-10-CM

## 2023-07-06 DIAGNOSIS — M54.6 ACUTE BILATERAL THORACIC BACK PAIN: Primary | ICD-10-CM

## 2023-07-06 PROCEDURE — 97112 NEUROMUSCULAR REEDUCATION: CPT

## 2023-07-06 PROCEDURE — 97110 THERAPEUTIC EXERCISES: CPT
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
## 2023-07-11 ENCOUNTER — OFFICE VISIT (OUTPATIENT)
Dept: PHYSICAL THERAPY | Facility: CLINIC | Age: 53
End: 2023-07-11
Payer: COMMERCIAL

## 2023-07-11 DIAGNOSIS — M54.50 ACUTE LEFT-SIDED LOW BACK PAIN WITHOUT SCIATICA: ICD-10-CM

## 2023-07-11 DIAGNOSIS — M54.2 NECK PAIN: ICD-10-CM

## 2023-07-11 DIAGNOSIS — M54.6 ACUTE BILATERAL THORACIC BACK PAIN: Primary | ICD-10-CM

## 2023-07-11 DIAGNOSIS — M79.18 MYOFASCIAL PAIN SYNDROME: ICD-10-CM

## 2023-07-11 PROCEDURE — 97110 THERAPEUTIC EXERCISES: CPT

## 2023-07-11 PROCEDURE — 97112 NEUROMUSCULAR REEDUCATION: CPT
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
## 2023-07-13 ENCOUNTER — APPOINTMENT (OUTPATIENT)
Dept: PHYSICAL THERAPY | Facility: CLINIC | Age: 53
End: 2023-07-13
Payer: COMMERCIAL

## 2023-07-17 ENCOUNTER — OFFICE VISIT (OUTPATIENT)
Dept: PHYSICAL THERAPY | Facility: CLINIC | Age: 53
End: 2023-07-17
Payer: COMMERCIAL

## 2023-07-17 DIAGNOSIS — M54.2 NECK PAIN: ICD-10-CM

## 2023-07-17 DIAGNOSIS — M54.6 ACUTE BILATERAL THORACIC BACK PAIN: Primary | ICD-10-CM

## 2023-07-17 DIAGNOSIS — M54.50 ACUTE LEFT-SIDED LOW BACK PAIN WITHOUT SCIATICA: ICD-10-CM

## 2023-07-17 DIAGNOSIS — M79.18 MYOFASCIAL PAIN SYNDROME: ICD-10-CM

## 2023-07-17 PROCEDURE — 97110 THERAPEUTIC EXERCISES: CPT

## 2023-07-17 PROCEDURE — 97112 NEUROMUSCULAR REEDUCATION: CPT
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
## 2023-07-20 ENCOUNTER — OFFICE VISIT (OUTPATIENT)
Dept: PHYSICAL THERAPY | Facility: CLINIC | Age: 53
End: 2023-07-20
Payer: COMMERCIAL

## 2023-07-20 DIAGNOSIS — M54.2 NECK PAIN: ICD-10-CM

## 2023-07-20 DIAGNOSIS — M54.6 ACUTE BILATERAL THORACIC BACK PAIN: Primary | ICD-10-CM

## 2023-07-20 DIAGNOSIS — M79.18 MYOFASCIAL PAIN SYNDROME: ICD-10-CM

## 2023-07-20 DIAGNOSIS — M54.50 ACUTE LEFT-SIDED LOW BACK PAIN WITHOUT SCIATICA: ICD-10-CM

## 2023-07-20 PROCEDURE — 97112 NEUROMUSCULAR REEDUCATION: CPT

## 2023-07-20 PROCEDURE — 97110 THERAPEUTIC EXERCISES: CPT

## 2023-07-25 ENCOUNTER — OFFICE VISIT (OUTPATIENT)
Dept: PHYSICAL THERAPY | Facility: CLINIC | Age: 53
End: 2023-07-25
Payer: COMMERCIAL

## 2023-07-25 DIAGNOSIS — M54.50 ACUTE LEFT-SIDED LOW BACK PAIN WITHOUT SCIATICA: ICD-10-CM

## 2023-07-25 DIAGNOSIS — M54.6 ACUTE BILATERAL THORACIC BACK PAIN: Primary | ICD-10-CM

## 2023-07-25 DIAGNOSIS — M79.18 MYOFASCIAL PAIN SYNDROME: ICD-10-CM

## 2023-07-25 DIAGNOSIS — M54.2 NECK PAIN: ICD-10-CM

## 2023-07-25 PROCEDURE — 97112 NEUROMUSCULAR REEDUCATION: CPT

## 2023-07-25 PROCEDURE — 97140 MANUAL THERAPY 1/> REGIONS: CPT

## 2023-07-25 PROCEDURE — 97110 THERAPEUTIC EXERCISES: CPT
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
## 2023-07-27 ENCOUNTER — OFFICE VISIT (OUTPATIENT)
Dept: PHYSICAL THERAPY | Facility: CLINIC | Age: 53
End: 2023-07-27
Payer: COMMERCIAL

## 2023-07-27 DIAGNOSIS — M54.6 ACUTE BILATERAL THORACIC BACK PAIN: Primary | ICD-10-CM

## 2023-07-27 DIAGNOSIS — M79.18 MYOFASCIAL PAIN SYNDROME: ICD-10-CM

## 2023-07-27 DIAGNOSIS — M54.2 NECK PAIN: ICD-10-CM

## 2023-07-27 DIAGNOSIS — M54.50 ACUTE LEFT-SIDED LOW BACK PAIN WITHOUT SCIATICA: ICD-10-CM

## 2023-07-27 PROCEDURE — 97112 NEUROMUSCULAR REEDUCATION: CPT | Performed by: PHYSICAL THERAPIST

## 2023-07-27 PROCEDURE — 97140 MANUAL THERAPY 1/> REGIONS: CPT | Performed by: PHYSICAL THERAPIST

## 2023-07-27 PROCEDURE — 97110 THERAPEUTIC EXERCISES: CPT | Performed by: PHYSICAL THERAPIST
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
## 2023-08-01 ENCOUNTER — OFFICE VISIT (OUTPATIENT)
Dept: PHYSICAL THERAPY | Facility: CLINIC | Age: 53
End: 2023-08-01
Payer: COMMERCIAL

## 2023-08-01 DIAGNOSIS — M54.6 ACUTE BILATERAL THORACIC BACK PAIN: Primary | ICD-10-CM

## 2023-08-01 DIAGNOSIS — M79.18 MYOFASCIAL PAIN SYNDROME: ICD-10-CM

## 2023-08-01 DIAGNOSIS — M54.50 ACUTE LEFT-SIDED LOW BACK PAIN WITHOUT SCIATICA: ICD-10-CM

## 2023-08-01 DIAGNOSIS — M54.2 NECK PAIN: ICD-10-CM

## 2023-08-01 PROCEDURE — 97112 NEUROMUSCULAR REEDUCATION: CPT

## 2023-08-01 PROCEDURE — 97140 MANUAL THERAPY 1/> REGIONS: CPT

## 2023-08-01 PROCEDURE — 97110 THERAPEUTIC EXERCISES: CPT
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
## 2023-08-03 ENCOUNTER — APPOINTMENT (OUTPATIENT)
Dept: PHYSICAL THERAPY | Facility: CLINIC | Age: 53
End: 2023-08-03
Payer: COMMERCIAL

## 2023-08-08 ENCOUNTER — OFFICE VISIT (OUTPATIENT)
Dept: PHYSICAL THERAPY | Facility: CLINIC | Age: 53
End: 2023-08-08
Payer: COMMERCIAL

## 2023-08-08 DIAGNOSIS — M79.18 MYOFASCIAL PAIN SYNDROME: ICD-10-CM

## 2023-08-08 DIAGNOSIS — M54.6 ACUTE BILATERAL THORACIC BACK PAIN: Primary | ICD-10-CM

## 2023-08-08 DIAGNOSIS — M54.50 ACUTE LEFT-SIDED LOW BACK PAIN WITHOUT SCIATICA: ICD-10-CM

## 2023-08-08 DIAGNOSIS — M54.2 NECK PAIN: ICD-10-CM

## 2023-08-08 PROCEDURE — 97112 NEUROMUSCULAR REEDUCATION: CPT

## 2023-08-08 PROCEDURE — 97110 THERAPEUTIC EXERCISES: CPT
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
## 2023-08-10 ENCOUNTER — OFFICE VISIT (OUTPATIENT)
Dept: PHYSICAL THERAPY | Facility: CLINIC | Age: 53
End: 2023-08-10
Payer: COMMERCIAL

## 2023-08-10 DIAGNOSIS — M54.50 ACUTE LEFT-SIDED LOW BACK PAIN WITHOUT SCIATICA: ICD-10-CM

## 2023-08-10 DIAGNOSIS — M54.6 ACUTE BILATERAL THORACIC BACK PAIN: Primary | ICD-10-CM

## 2023-08-10 DIAGNOSIS — M79.18 MYOFASCIAL PAIN SYNDROME: ICD-10-CM

## 2023-08-10 DIAGNOSIS — M54.2 NECK PAIN: ICD-10-CM

## 2023-08-10 PROCEDURE — 97110 THERAPEUTIC EXERCISES: CPT

## 2023-08-10 PROCEDURE — 97112 NEUROMUSCULAR REEDUCATION: CPT
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
## 2023-08-15 ENCOUNTER — APPOINTMENT (OUTPATIENT)
Dept: PHYSICAL THERAPY | Facility: CLINIC | Age: 53
End: 2023-08-15
Payer: COMMERCIAL

## 2023-08-18 ENCOUNTER — TELEPHONE (OUTPATIENT)
Age: 53
End: 2023-08-18

## 2023-08-18 NOTE — TELEPHONE ENCOUNTER
Caller: patient    Doctor: AS    Reason for call: would like to schedule a procedure, lower back pain    Call back#:

## 2023-08-18 NOTE — TELEPHONE ENCOUNTER
Please advise AS no longer with [facility] and schedule ov with [facility] when pt calls back for eval    Lm for pt to cb
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
## 2023-08-31 ENCOUNTER — OFFICE VISIT (OUTPATIENT)
Dept: PAIN MEDICINE | Facility: CLINIC | Age: 53
End: 2023-08-31
Payer: COMMERCIAL

## 2023-08-31 ENCOUNTER — DOCUMENTATION (OUTPATIENT)
Dept: PAIN MEDICINE | Facility: CLINIC | Age: 53
End: 2023-08-31

## 2023-08-31 ENCOUNTER — TELEPHONE (OUTPATIENT)
Dept: PAIN MEDICINE | Facility: CLINIC | Age: 53
End: 2023-08-31

## 2023-08-31 VITALS
SYSTOLIC BLOOD PRESSURE: 170 MMHG | HEART RATE: 73 BPM | BODY MASS INDEX: 29.55 KG/M2 | DIASTOLIC BLOOD PRESSURE: 88 MMHG | HEIGHT: 68 IN | WEIGHT: 195 LBS

## 2023-08-31 DIAGNOSIS — M54.2 NECK PAIN: ICD-10-CM

## 2023-08-31 DIAGNOSIS — M47.816 LUMBAR FACET ARTHROPATHY: ICD-10-CM

## 2023-08-31 DIAGNOSIS — M79.18 MYOFASCIAL PAIN SYNDROME: Primary | ICD-10-CM

## 2023-08-31 DIAGNOSIS — S16.1XXD STRAIN OF NECK MUSCLE, SUBSEQUENT ENCOUNTER: ICD-10-CM

## 2023-08-31 PROBLEM — S16.1XXA CERVICAL STRAIN: Status: ACTIVE | Noted: 2023-08-31

## 2023-08-31 PROCEDURE — 99214 OFFICE O/P EST MOD 30 MIN: CPT | Performed by: STUDENT IN AN ORGANIZED HEALTH CARE EDUCATION/TRAINING PROGRAM

## 2023-09-13 ENCOUNTER — PROCEDURE VISIT (OUTPATIENT)
Dept: PAIN MEDICINE | Facility: CLINIC | Age: 53
End: 2023-09-13
Payer: COMMERCIAL

## 2023-09-13 VITALS
HEART RATE: 69 BPM | HEIGHT: 68 IN | BODY MASS INDEX: 29.16 KG/M2 | DIASTOLIC BLOOD PRESSURE: 93 MMHG | SYSTOLIC BLOOD PRESSURE: 169 MMHG | WEIGHT: 192.4 LBS

## 2023-09-13 DIAGNOSIS — S16.1XXD STRAIN OF NECK MUSCLE, SUBSEQUENT ENCOUNTER: ICD-10-CM

## 2023-09-13 DIAGNOSIS — M54.2 NECK PAIN: ICD-10-CM

## 2023-09-13 DIAGNOSIS — M79.18 MYOFASCIAL PAIN SYNDROME: ICD-10-CM

## 2023-09-13 PROCEDURE — 20552 NJX 1/MLT TRIGGER POINT 1/2: CPT | Performed by: STUDENT IN AN ORGANIZED HEALTH CARE EDUCATION/TRAINING PROGRAM

## 2023-09-13 PROCEDURE — 76942 ECHO GUIDE FOR BIOPSY: CPT | Performed by: STUDENT IN AN ORGANIZED HEALTH CARE EDUCATION/TRAINING PROGRAM

## 2023-09-13 RX ORDER — BUPIVACAINE HYDROCHLORIDE 2.5 MG/ML
4 INJECTION, SOLUTION EPIDURAL; INFILTRATION; INTRACAUDAL ONCE
Status: COMPLETED | OUTPATIENT
Start: 2023-09-13 | End: 2023-09-13

## 2023-09-13 RX ORDER — METHYLPREDNISOLONE ACETATE 40 MG/ML
40 INJECTION, SUSPENSION INTRA-ARTICULAR; INTRALESIONAL; INTRAMUSCULAR; SOFT TISSUE ONCE
Status: COMPLETED | OUTPATIENT
Start: 2023-09-13 | End: 2023-09-13

## 2023-09-13 RX ADMIN — METHYLPREDNISOLONE ACETATE 40 MG: 40 INJECTION, SUSPENSION INTRA-ARTICULAR; INTRALESIONAL; INTRAMUSCULAR; SOFT TISSUE at 11:27

## 2023-09-13 RX ADMIN — BUPIVACAINE HYDROCHLORIDE 4 ML: 2.5 INJECTION, SOLUTION EPIDURAL; INFILTRATION; INTRACAUDAL at 11:26

## 2023-09-13 NOTE — PROGRESS NOTES
Universal Protocol:  Consent: Verbal consent obtained. Written consent obtained. Risks and benefits: risks, benefits and alternatives were discussed  Consent given by: patient  Time out: Immediately prior to procedure a "time out" was called to verify the correct patient, procedure, equipment, support staff and site/side marked as required. Timeout called at: 9/13/2023 10:43 AM.  Patient understanding: patient states understanding of the procedure being performed  Patient consent: the patient's understanding of the procedure matches consent given  Procedure consent: procedure consent matches procedure scheduled  Relevant documents: relevant documents present and verified  Test results: test results available and properly labeled  Site marked: the operative site was marked  Radiology Images displayed and confirmed. If images not available, report reviewed: imaging studies available  Required items: required blood products, implants, devices, and special equipment available  Patient identity confirmed: verbally with patient    Supporting Documentation  Indications: pain   Procedure Details  Location(s):  Additional procedure details: PROCEDURE NOTE    PATIENT NAME:  Lizzie Hickey    MEDICAL RECORD NUMBER:  [id number]    YOB: 1970    DATE OF PROCEDURE:  09/13/23    PROCEDURE:  Trigger point injection x 4 with local anesthetic and steroid in the left trapezius and left thoracolumbar muscle groups under ultrasound guidance. ATTENDING PHYSICIAN:  Danielle Wilson M.D. PREPROCEDURE DIAGNOSIS:  Myofascial pain with identifiable trigger points. POSTPROCEDURE DIAGNOSIS:  Myofascial pain with identifiable trigger points. ANESTHESIA:  Local  ESTIMATED BLOOD LOSS:  Minimal  COMPLICATIONS: None  CONSENT:  Today's procedure, its potential benefits as well as its risks and potential side effects were reviewed.   Discussed risks of the procedure include bleeding, infection, nerve irritation or damage, reactions to the medications, failure of the pain to improve and exacerbation of the pain were explained to the patient, who verbalized understanding and who wished to proceed. Informed consent was signed. DESCRIPTION OF THE PROCEDURE:  After informed consent was obtained, the patient was placed in the seated position. 4 trigger points were identified via palpation and marked with a surgical skin marker. The skin was prepped with antiseptic in the usual sterile fashion. Strict aseptic technique was utilized throughout the procedure. Using ultrasound guidance a 25 gauge, 2inch needle was then advanced into each identified trigger point. Care was taken to visualize the entirety of the needle throughout the injection. An injectate consisting of 4 ml of 0.25% marcaine with 1 mL of 40mg/mL depo-medrol was slowly injected in divided doses after negative aspiration. The needle was removed with tip intact. The patient tolerated the procedure and hemostasis was maintained. There were no apparent paresthesias or complications. The skin was wiped clean, and a band-aid was placed as appropriate. The patient was monitored for an appropriate period of time following the procedure and remained hemodynamically stable and neurovascularly intact. The patient was ultimately discharged to home with supervision in good condition and instructed to call the office to report the response.

## 2023-09-19 ENCOUNTER — OFFICE VISIT (OUTPATIENT)
Dept: PHYSICAL THERAPY | Facility: CLINIC | Age: 53
End: 2023-09-19
Payer: COMMERCIAL

## 2023-09-19 DIAGNOSIS — M54.50 ACUTE LEFT-SIDED LOW BACK PAIN WITHOUT SCIATICA: ICD-10-CM

## 2023-09-19 DIAGNOSIS — M54.6 ACUTE BILATERAL THORACIC BACK PAIN: Primary | ICD-10-CM

## 2023-09-19 DIAGNOSIS — M79.18 MYOFASCIAL PAIN SYNDROME: ICD-10-CM

## 2023-09-19 DIAGNOSIS — M54.2 NECK PAIN: ICD-10-CM

## 2023-09-19 PROCEDURE — 97112 NEUROMUSCULAR REEDUCATION: CPT

## 2023-09-19 PROCEDURE — 97110 THERAPEUTIC EXERCISES: CPT
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
## 2023-09-20 ENCOUNTER — APPOINTMENT (OUTPATIENT)
Dept: PHYSICAL THERAPY | Facility: CLINIC | Age: 53
End: 2023-09-20
Payer: COMMERCIAL

## 2023-09-22 ENCOUNTER — EVALUATION (OUTPATIENT)
Dept: PHYSICAL THERAPY | Facility: CLINIC | Age: 53
End: 2023-09-22
Payer: COMMERCIAL

## 2023-09-22 DIAGNOSIS — M79.18 MYOFASCIAL PAIN SYNDROME: ICD-10-CM

## 2023-09-22 DIAGNOSIS — M54.50 ACUTE LEFT-SIDED LOW BACK PAIN WITHOUT SCIATICA: ICD-10-CM

## 2023-09-22 DIAGNOSIS — M54.2 NECK PAIN: ICD-10-CM

## 2023-09-22 DIAGNOSIS — M54.6 ACUTE BILATERAL THORACIC BACK PAIN: Primary | ICD-10-CM

## 2023-09-22 PROCEDURE — 97110 THERAPEUTIC EXERCISES: CPT

## 2023-09-22 PROCEDURE — 97112 NEUROMUSCULAR REEDUCATION: CPT
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
## 2023-09-27 ENCOUNTER — OFFICE VISIT (OUTPATIENT)
Dept: PHYSICAL THERAPY | Facility: CLINIC | Age: 53
End: 2023-09-27
Payer: COMMERCIAL

## 2023-09-27 DIAGNOSIS — M54.50 ACUTE LEFT-SIDED LOW BACK PAIN WITHOUT SCIATICA: ICD-10-CM

## 2023-09-27 DIAGNOSIS — M54.2 NECK PAIN: ICD-10-CM

## 2023-09-27 DIAGNOSIS — M79.18 MYOFASCIAL PAIN SYNDROME: ICD-10-CM

## 2023-09-27 DIAGNOSIS — M54.6 ACUTE BILATERAL THORACIC BACK PAIN: Primary | ICD-10-CM

## 2023-09-27 PROCEDURE — 97112 NEUROMUSCULAR REEDUCATION: CPT

## 2023-09-27 PROCEDURE — 97110 THERAPEUTIC EXERCISES: CPT

## 2023-09-29 ENCOUNTER — OFFICE VISIT (OUTPATIENT)
Dept: PHYSICAL THERAPY | Facility: CLINIC | Age: 53
End: 2023-09-29
Payer: COMMERCIAL

## 2023-09-29 DIAGNOSIS — M54.6 ACUTE BILATERAL THORACIC BACK PAIN: Primary | ICD-10-CM

## 2023-09-29 DIAGNOSIS — M54.2 NECK PAIN: ICD-10-CM

## 2023-09-29 DIAGNOSIS — M54.50 ACUTE LEFT-SIDED LOW BACK PAIN WITHOUT SCIATICA: ICD-10-CM

## 2023-09-29 DIAGNOSIS — M79.18 MYOFASCIAL PAIN SYNDROME: ICD-10-CM

## 2023-09-29 PROCEDURE — 97110 THERAPEUTIC EXERCISES: CPT

## 2023-09-29 PROCEDURE — 97112 NEUROMUSCULAR REEDUCATION: CPT
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
## 2023-10-03 ENCOUNTER — OFFICE VISIT (OUTPATIENT)
Dept: PHYSICAL THERAPY | Facility: CLINIC | Age: 53
End: 2023-10-03
Payer: COMMERCIAL

## 2023-10-03 DIAGNOSIS — M54.50 ACUTE LEFT-SIDED LOW BACK PAIN WITHOUT SCIATICA: ICD-10-CM

## 2023-10-03 DIAGNOSIS — M54.2 NECK PAIN: ICD-10-CM

## 2023-10-03 DIAGNOSIS — M54.6 ACUTE BILATERAL THORACIC BACK PAIN: Primary | ICD-10-CM

## 2023-10-03 DIAGNOSIS — M79.18 MYOFASCIAL PAIN SYNDROME: ICD-10-CM

## 2023-10-03 PROCEDURE — 97530 THERAPEUTIC ACTIVITIES: CPT

## 2023-10-03 PROCEDURE — 97112 NEUROMUSCULAR REEDUCATION: CPT

## 2023-10-03 PROCEDURE — 97110 THERAPEUTIC EXERCISES: CPT
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
## 2023-10-06 ENCOUNTER — APPOINTMENT (OUTPATIENT)
Dept: PHYSICAL THERAPY | Facility: CLINIC | Age: 53
End: 2023-10-06
Payer: COMMERCIAL

## 2023-10-10 ENCOUNTER — OFFICE VISIT (OUTPATIENT)
Dept: PHYSICAL THERAPY | Facility: CLINIC | Age: 53
End: 2023-10-10
Payer: COMMERCIAL

## 2023-10-10 DIAGNOSIS — M79.18 MYOFASCIAL PAIN SYNDROME: ICD-10-CM

## 2023-10-10 DIAGNOSIS — M54.50 ACUTE LEFT-SIDED LOW BACK PAIN WITHOUT SCIATICA: ICD-10-CM

## 2023-10-10 DIAGNOSIS — M54.2 NECK PAIN: ICD-10-CM

## 2023-10-10 DIAGNOSIS — M54.6 ACUTE BILATERAL THORACIC BACK PAIN: Primary | ICD-10-CM

## 2023-10-10 PROCEDURE — 97112 NEUROMUSCULAR REEDUCATION: CPT

## 2023-10-10 PROCEDURE — 97110 THERAPEUTIC EXERCISES: CPT
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
## 2023-10-12 ENCOUNTER — OFFICE VISIT (OUTPATIENT)
Dept: PHYSICAL THERAPY | Facility: CLINIC | Age: 53
End: 2023-10-12
Payer: COMMERCIAL

## 2023-10-12 DIAGNOSIS — M54.2 NECK PAIN: ICD-10-CM

## 2023-10-12 DIAGNOSIS — M79.18 MYOFASCIAL PAIN SYNDROME: ICD-10-CM

## 2023-10-12 DIAGNOSIS — M54.50 ACUTE LEFT-SIDED LOW BACK PAIN WITHOUT SCIATICA: ICD-10-CM

## 2023-10-12 DIAGNOSIS — M54.6 ACUTE BILATERAL THORACIC BACK PAIN: Primary | ICD-10-CM

## 2023-10-12 PROCEDURE — 97530 THERAPEUTIC ACTIVITIES: CPT

## 2023-10-12 PROCEDURE — 97112 NEUROMUSCULAR REEDUCATION: CPT

## 2023-10-12 PROCEDURE — 97110 THERAPEUTIC EXERCISES: CPT
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
## 2023-10-13 ENCOUNTER — APPOINTMENT (OUTPATIENT)
Dept: PHYSICAL THERAPY | Facility: CLINIC | Age: 53
End: 2023-10-13
Payer: COMMERCIAL

## 2023-10-17 ENCOUNTER — OFFICE VISIT (OUTPATIENT)
Dept: PHYSICAL THERAPY | Facility: CLINIC | Age: 53
End: 2023-10-17
Payer: COMMERCIAL

## 2023-10-17 DIAGNOSIS — M54.2 NECK PAIN: ICD-10-CM

## 2023-10-17 DIAGNOSIS — M54.50 ACUTE LEFT-SIDED LOW BACK PAIN WITHOUT SCIATICA: ICD-10-CM

## 2023-10-17 DIAGNOSIS — M54.6 ACUTE BILATERAL THORACIC BACK PAIN: Primary | ICD-10-CM

## 2023-10-17 DIAGNOSIS — M79.18 MYOFASCIAL PAIN SYNDROME: ICD-10-CM

## 2023-10-17 PROCEDURE — 97530 THERAPEUTIC ACTIVITIES: CPT

## 2023-10-17 PROCEDURE — 97110 THERAPEUTIC EXERCISES: CPT

## 2023-10-17 PROCEDURE — 97112 NEUROMUSCULAR REEDUCATION: CPT
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
## 2023-10-19 ENCOUNTER — OFFICE VISIT (OUTPATIENT)
Dept: PHYSICAL THERAPY | Facility: CLINIC | Age: 53
End: 2023-10-19
Payer: COMMERCIAL

## 2023-10-19 DIAGNOSIS — M54.2 NECK PAIN: ICD-10-CM

## 2023-10-19 DIAGNOSIS — M79.18 MYOFASCIAL PAIN SYNDROME: ICD-10-CM

## 2023-10-19 DIAGNOSIS — M54.6 ACUTE BILATERAL THORACIC BACK PAIN: Primary | ICD-10-CM

## 2023-10-19 DIAGNOSIS — M54.50 ACUTE LEFT-SIDED LOW BACK PAIN WITHOUT SCIATICA: ICD-10-CM

## 2023-10-19 PROCEDURE — 97112 NEUROMUSCULAR REEDUCATION: CPT | Performed by: PHYSICAL THERAPIST
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
## 2023-10-20 ENCOUNTER — APPOINTMENT (OUTPATIENT)
Dept: PHYSICAL THERAPY | Facility: CLINIC | Age: 53
End: 2023-10-20
Payer: COMMERCIAL

## 2023-10-23 ENCOUNTER — OFFICE VISIT (OUTPATIENT)
Dept: PHYSICAL THERAPY | Facility: CLINIC | Age: 53
End: 2023-10-23
Payer: COMMERCIAL

## 2023-10-23 DIAGNOSIS — M54.6 ACUTE BILATERAL THORACIC BACK PAIN: Primary | ICD-10-CM

## 2023-10-23 DIAGNOSIS — M79.18 MYOFASCIAL PAIN SYNDROME: ICD-10-CM

## 2023-10-23 DIAGNOSIS — M54.50 ACUTE LEFT-SIDED LOW BACK PAIN WITHOUT SCIATICA: ICD-10-CM

## 2023-10-23 DIAGNOSIS — M54.2 NECK PAIN: ICD-10-CM

## 2023-10-23 PROCEDURE — 97112 NEUROMUSCULAR REEDUCATION: CPT

## 2023-10-23 PROCEDURE — 97110 THERAPEUTIC EXERCISES: CPT
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
## 2023-10-24 ENCOUNTER — OFFICE VISIT (OUTPATIENT)
Dept: PHYSICAL THERAPY | Facility: CLINIC | Age: 53
End: 2023-10-24
Payer: COMMERCIAL

## 2023-10-24 DIAGNOSIS — M79.18 MYOFASCIAL PAIN SYNDROME: ICD-10-CM

## 2023-10-24 DIAGNOSIS — M54.50 ACUTE LEFT-SIDED LOW BACK PAIN WITHOUT SCIATICA: ICD-10-CM

## 2023-10-24 DIAGNOSIS — M54.2 NECK PAIN: ICD-10-CM

## 2023-10-24 DIAGNOSIS — M54.6 ACUTE BILATERAL THORACIC BACK PAIN: Primary | ICD-10-CM

## 2023-10-24 PROCEDURE — 97110 THERAPEUTIC EXERCISES: CPT

## 2023-10-24 PROCEDURE — 97112 NEUROMUSCULAR REEDUCATION: CPT
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
## 2023-10-30 ENCOUNTER — OFFICE VISIT (OUTPATIENT)
Dept: PHYSICAL THERAPY | Facility: CLINIC | Age: 53
End: 2023-10-30
Payer: COMMERCIAL

## 2023-10-30 DIAGNOSIS — M54.50 ACUTE LEFT-SIDED LOW BACK PAIN WITHOUT SCIATICA: ICD-10-CM

## 2023-10-30 DIAGNOSIS — M54.2 NECK PAIN: ICD-10-CM

## 2023-10-30 DIAGNOSIS — M79.18 MYOFASCIAL PAIN SYNDROME: ICD-10-CM

## 2023-10-30 DIAGNOSIS — M54.6 ACUTE BILATERAL THORACIC BACK PAIN: Primary | ICD-10-CM

## 2023-10-30 PROCEDURE — 97110 THERAPEUTIC EXERCISES: CPT

## 2023-10-30 PROCEDURE — 97112 NEUROMUSCULAR REEDUCATION: CPT

## 2023-10-30 PROCEDURE — 97530 THERAPEUTIC ACTIVITIES: CPT
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
## 2023-11-01 ENCOUNTER — APPOINTMENT (OUTPATIENT)
Dept: PHYSICAL THERAPY | Facility: CLINIC | Age: 53
End: 2023-11-01
Payer: COMMERCIAL

## 2023-11-02 ENCOUNTER — OFFICE VISIT (OUTPATIENT)
Dept: PHYSICAL THERAPY | Facility: CLINIC | Age: 53
End: 2023-11-02
Payer: COMMERCIAL

## 2023-11-02 DIAGNOSIS — M54.6 ACUTE BILATERAL THORACIC BACK PAIN: Primary | ICD-10-CM

## 2023-11-02 DIAGNOSIS — M54.2 NECK PAIN: ICD-10-CM

## 2023-11-02 DIAGNOSIS — M79.18 MYOFASCIAL PAIN SYNDROME: ICD-10-CM

## 2023-11-02 DIAGNOSIS — M54.50 ACUTE LEFT-SIDED LOW BACK PAIN WITHOUT SCIATICA: ICD-10-CM

## 2023-11-02 PROCEDURE — 97112 NEUROMUSCULAR REEDUCATION: CPT

## 2023-11-02 PROCEDURE — 97110 THERAPEUTIC EXERCISES: CPT

## 2023-11-02 PROCEDURE — 97140 MANUAL THERAPY 1/> REGIONS: CPT
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
## 2023-11-07 ENCOUNTER — APPOINTMENT (OUTPATIENT)
Dept: PHYSICAL THERAPY | Facility: CLINIC | Age: 53
End: 2023-11-07
Payer: COMMERCIAL

## 2023-11-08 ENCOUNTER — APPOINTMENT (OUTPATIENT)
Dept: PHYSICAL THERAPY | Facility: CLINIC | Age: 53
End: 2023-11-08
Payer: COMMERCIAL

## 2023-11-09 ENCOUNTER — OFFICE VISIT (OUTPATIENT)
Dept: PHYSICAL THERAPY | Facility: CLINIC | Age: 53
End: 2023-11-09
Payer: COMMERCIAL

## 2023-11-09 DIAGNOSIS — M54.6 ACUTE BILATERAL THORACIC BACK PAIN: Primary | ICD-10-CM

## 2023-11-09 DIAGNOSIS — M54.2 NECK PAIN: ICD-10-CM

## 2023-11-09 DIAGNOSIS — M79.18 MYOFASCIAL PAIN SYNDROME: ICD-10-CM

## 2023-11-09 DIAGNOSIS — M54.50 ACUTE LEFT-SIDED LOW BACK PAIN WITHOUT SCIATICA: ICD-10-CM

## 2023-11-09 PROCEDURE — 97110 THERAPEUTIC EXERCISES: CPT

## 2023-11-09 PROCEDURE — 97112 NEUROMUSCULAR REEDUCATION: CPT
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
## 2023-11-13 ENCOUNTER — OFFICE VISIT (OUTPATIENT)
Dept: PHYSICAL THERAPY | Facility: CLINIC | Age: 53
End: 2023-11-13
Payer: COMMERCIAL

## 2023-11-13 DIAGNOSIS — M54.2 NECK PAIN: ICD-10-CM

## 2023-11-13 DIAGNOSIS — M54.50 ACUTE LEFT-SIDED LOW BACK PAIN WITHOUT SCIATICA: ICD-10-CM

## 2023-11-13 DIAGNOSIS — M79.18 MYOFASCIAL PAIN SYNDROME: ICD-10-CM

## 2023-11-13 DIAGNOSIS — M54.6 ACUTE BILATERAL THORACIC BACK PAIN: Primary | ICD-10-CM

## 2023-11-13 PROCEDURE — 97112 NEUROMUSCULAR REEDUCATION: CPT

## 2023-11-13 PROCEDURE — 97110 THERAPEUTIC EXERCISES: CPT
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
## 2023-11-14 ENCOUNTER — OFFICE VISIT (OUTPATIENT)
Dept: PHYSICAL THERAPY | Facility: CLINIC | Age: 53
End: 2023-11-14
Payer: COMMERCIAL

## 2023-11-14 DIAGNOSIS — M79.18 MYOFASCIAL PAIN SYNDROME: ICD-10-CM

## 2023-11-14 DIAGNOSIS — M54.50 ACUTE LEFT-SIDED LOW BACK PAIN WITHOUT SCIATICA: ICD-10-CM

## 2023-11-14 DIAGNOSIS — M54.2 NECK PAIN: ICD-10-CM

## 2023-11-14 DIAGNOSIS — M54.6 ACUTE BILATERAL THORACIC BACK PAIN: Primary | ICD-10-CM

## 2023-11-14 PROCEDURE — 97112 NEUROMUSCULAR REEDUCATION: CPT

## 2023-11-14 PROCEDURE — 97110 THERAPEUTIC EXERCISES: CPT
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
## 2023-11-20 ENCOUNTER — EVALUATION (OUTPATIENT)
Dept: PHYSICAL THERAPY | Facility: CLINIC | Age: 53
End: 2023-11-20
Payer: COMMERCIAL

## 2023-11-20 DIAGNOSIS — M79.18 MYOFASCIAL PAIN SYNDROME: ICD-10-CM

## 2023-11-20 DIAGNOSIS — M54.50 ACUTE LEFT-SIDED LOW BACK PAIN WITHOUT SCIATICA: ICD-10-CM

## 2023-11-20 DIAGNOSIS — M54.2 NECK PAIN: ICD-10-CM

## 2023-11-20 DIAGNOSIS — M54.6 ACUTE BILATERAL THORACIC BACK PAIN: Primary | ICD-10-CM

## 2023-11-20 PROCEDURE — 97112 NEUROMUSCULAR REEDUCATION: CPT

## 2023-11-20 PROCEDURE — 97530 THERAPEUTIC ACTIVITIES: CPT

## 2023-11-20 PROCEDURE — 97110 THERAPEUTIC EXERCISES: CPT
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
## 2023-11-21 ENCOUNTER — APPOINTMENT (OUTPATIENT)
Dept: PHYSICAL THERAPY | Facility: CLINIC | Age: 53
End: 2023-11-21
Payer: COMMERCIAL

## 2023-11-27 ENCOUNTER — OFFICE VISIT (OUTPATIENT)
Dept: PHYSICAL THERAPY | Facility: CLINIC | Age: 53
End: 2023-11-27
Payer: COMMERCIAL

## 2023-11-27 DIAGNOSIS — M54.6 ACUTE BILATERAL THORACIC BACK PAIN: Primary | ICD-10-CM

## 2023-11-27 DIAGNOSIS — M54.2 NECK PAIN: ICD-10-CM

## 2023-11-27 DIAGNOSIS — M79.18 MYOFASCIAL PAIN SYNDROME: ICD-10-CM

## 2023-11-27 DIAGNOSIS — M54.50 ACUTE LEFT-SIDED LOW BACK PAIN WITHOUT SCIATICA: ICD-10-CM

## 2023-11-27 PROCEDURE — 97112 NEUROMUSCULAR REEDUCATION: CPT

## 2023-11-27 PROCEDURE — 97110 THERAPEUTIC EXERCISES: CPT
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
## 2023-11-29 ENCOUNTER — OFFICE VISIT (OUTPATIENT)
Dept: PHYSICAL THERAPY | Facility: CLINIC | Age: 53
End: 2023-11-29
Payer: COMMERCIAL

## 2023-11-29 DIAGNOSIS — M54.2 NECK PAIN: ICD-10-CM

## 2023-11-29 DIAGNOSIS — M54.6 ACUTE BILATERAL THORACIC BACK PAIN: Primary | ICD-10-CM

## 2023-11-29 DIAGNOSIS — M79.18 MYOFASCIAL PAIN SYNDROME: ICD-10-CM

## 2023-11-29 DIAGNOSIS — M54.50 ACUTE LEFT-SIDED LOW BACK PAIN WITHOUT SCIATICA: ICD-10-CM

## 2023-11-29 PROCEDURE — 97110 THERAPEUTIC EXERCISES: CPT

## 2023-11-29 PROCEDURE — 97112 NEUROMUSCULAR REEDUCATION: CPT
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
